# Patient Record
Sex: FEMALE | Race: WHITE | NOT HISPANIC OR LATINO | Employment: UNEMPLOYED | URBAN - METROPOLITAN AREA
[De-identification: names, ages, dates, MRNs, and addresses within clinical notes are randomized per-mention and may not be internally consistent; named-entity substitution may affect disease eponyms.]

---

## 2020-07-15 LAB — POC BETA-HCG (QUAL): POSITIVE

## 2021-06-23 LAB — POC BETA-HCG (QUAL): NEGATIVE

## 2021-07-28 LAB — POC BETA-HCG (QUAL): NEGATIVE

## 2022-04-11 ENCOUNTER — HISTORICAL (OUTPATIENT)
Dept: ADMINISTRATIVE | Facility: HOSPITAL | Age: 22
End: 2022-04-11

## 2022-04-29 VITALS
DIASTOLIC BLOOD PRESSURE: 58 MMHG | WEIGHT: 107 LBS | HEIGHT: 63 IN | SYSTOLIC BLOOD PRESSURE: 82 MMHG | BODY MASS INDEX: 18.96 KG/M2

## 2022-09-21 ENCOUNTER — HISTORICAL (OUTPATIENT)
Dept: ADMINISTRATIVE | Facility: HOSPITAL | Age: 22
End: 2022-09-21

## 2023-01-24 ENCOUNTER — DOCUMENTATION ONLY (OUTPATIENT)
Dept: OBSTETRICS AND GYNECOLOGY | Facility: CLINIC | Age: 23
End: 2023-01-24

## 2023-02-07 ENCOUNTER — TELEPHONE (OUTPATIENT)
Dept: OBSTETRICS AND GYNECOLOGY | Facility: CLINIC | Age: 23
End: 2023-02-07

## 2023-02-07 NOTE — TELEPHONE ENCOUNTER
Patient aware of results; will begin taking probiotic Florajen.    ----- Message from Roberto Kim MD sent at 2/7/2023  8:01 AM CST -----  ONE SWAB WAS NEGATIVE BUT DID NOT SHOW ANY LACTOBACILLI.  PT NEEDS FLORAJEN FOR WOMEN, OTC.

## 2023-06-14 ENCOUNTER — OFFICE VISIT (OUTPATIENT)
Dept: OBSTETRICS AND GYNECOLOGY | Facility: CLINIC | Age: 23
End: 2023-06-14
Payer: MEDICAID

## 2023-06-14 VITALS
WEIGHT: 100.44 LBS | BODY MASS INDEX: 18.48 KG/M2 | OXYGEN SATURATION: 99 % | RESPIRATION RATE: 17 BRPM | HEIGHT: 62 IN | HEART RATE: 87 BPM | SYSTOLIC BLOOD PRESSURE: 110 MMHG | DIASTOLIC BLOOD PRESSURE: 62 MMHG

## 2023-06-14 DIAGNOSIS — R30.0 DYSURIA: ICD-10-CM

## 2023-06-14 DIAGNOSIS — Z01.419 WOMEN'S ANNUAL ROUTINE GYNECOLOGICAL EXAMINATION: ICD-10-CM

## 2023-06-14 DIAGNOSIS — N89.8 VAGINAL DISCHARGE: Primary | ICD-10-CM

## 2023-06-14 LAB
BILIRUB UR QL STRIP: NEGATIVE
GLUCOSE UR QL STRIP: NEGATIVE
KETONES UR QL STRIP: NEGATIVE
LEUKOCYTE ESTERASE UR QL STRIP: NEGATIVE
PH, POC UA: 7
POC BLOOD, URINE: NEGATIVE
POC NITRATES, URINE: NEGATIVE
PROT UR QL STRIP: NEGATIVE
SP GR UR STRIP: 1.01 (ref 1–1.03)
UROBILINOGEN UR STRIP-ACNC: 0.2 (ref 0.1–1.1)

## 2023-06-14 PROCEDURE — 3074F SYST BP LT 130 MM HG: CPT | Mod: CPTII,,, | Performed by: NURSE PRACTITIONER

## 2023-06-14 PROCEDURE — 3074F PR MOST RECENT SYSTOLIC BLOOD PRESSURE < 130 MM HG: ICD-10-PCS | Mod: CPTII,,, | Performed by: NURSE PRACTITIONER

## 2023-06-14 PROCEDURE — 81003 URINALYSIS AUTO W/O SCOPE: CPT | Mod: QW,,, | Performed by: NURSE PRACTITIONER

## 2023-06-14 PROCEDURE — 99395 PREV VISIT EST AGE 18-39: CPT | Mod: 25,,, | Performed by: NURSE PRACTITIONER

## 2023-06-14 PROCEDURE — 1159F MED LIST DOCD IN RCRD: CPT | Mod: CPTII,,, | Performed by: NURSE PRACTITIONER

## 2023-06-14 PROCEDURE — 81003 POCT URINALYSIS, DIPSTICK, AUTOMATED, W/O SCOPE: ICD-10-PCS | Mod: QW,,, | Performed by: NURSE PRACTITIONER

## 2023-06-14 PROCEDURE — 3078F DIAST BP <80 MM HG: CPT | Mod: CPTII,,, | Performed by: NURSE PRACTITIONER

## 2023-06-14 PROCEDURE — 3008F BODY MASS INDEX DOCD: CPT | Mod: CPTII,,, | Performed by: NURSE PRACTITIONER

## 2023-06-14 PROCEDURE — 99395 PR PREVENTIVE VISIT,EST,18-39: ICD-10-PCS | Mod: 25,,, | Performed by: NURSE PRACTITIONER

## 2023-06-14 PROCEDURE — 3008F PR BODY MASS INDEX (BMI) DOCUMENTED: ICD-10-PCS | Mod: CPTII,,, | Performed by: NURSE PRACTITIONER

## 2023-06-14 PROCEDURE — 3078F PR MOST RECENT DIASTOLIC BLOOD PRESSURE < 80 MM HG: ICD-10-PCS | Mod: CPTII,,, | Performed by: NURSE PRACTITIONER

## 2023-06-14 PROCEDURE — 1159F PR MEDICATION LIST DOCUMENTED IN MEDICAL RECORD: ICD-10-PCS | Mod: CPTII,,, | Performed by: NURSE PRACTITIONER

## 2023-06-14 PROCEDURE — 1160F PR REVIEW ALL MEDS BY PRESCRIBER/CLIN PHARMACIST DOCUMENTED: ICD-10-PCS | Mod: CPTII,,, | Performed by: NURSE PRACTITIONER

## 2023-06-14 PROCEDURE — 1160F RVW MEDS BY RX/DR IN RCRD: CPT | Mod: CPTII,,, | Performed by: NURSE PRACTITIONER

## 2023-06-14 NOTE — PROGRESS NOTES
"Patient ID: [id number]   Chief Complaint: Annual exam  Chief Complaint   Patient presents with    Vaginal Discharge     C/o white/yellow discharge that patient states "its runny" no foul odor. C/o burning upon urination.      HPI:   Carol West is a 23 y.o. year old  here for her Annual Exam. Patient's last menstrual period was 2023 (exact date). She is doing well. Denies any health changes. Vaginal Discharge (C/o white/yellow discharge that patient states "its runny" no foul odor. C/o burning upon urination. )    Subjective:   History reviewed. No pertinent past medical history.  History reviewed. No pertinent surgical history.  Social History     Tobacco Use    Smoking status: Never     Passive exposure: Never    Smokeless tobacco: Never   Substance Use Topics    Alcohol use: Yes     Comment: social    Drug use: Never     History reviewed. No pertinent family history.  OB History    Para Term  AB Living   2 2 2     2   SAB IAB Ectopic Multiple Live Births           2      # Outcome Date GA Lbr Alexis/2nd Weight Sex Delivery Anes PTL Lv   2 Term 21 39w0d  3.345 kg (7 lb 6 oz) M Vag-Spont EPI  MICHAEL   1 Term 19 39w0d  2.92 kg (6 lb 7 oz) M Vag-Spont EPI  MICHAEL     Review of Systems 12 point review of systems conducted, negative except as stated in the history of present illness. See HPI for details.  Objective:   Visit Vitals  /62 (BP Location: Left arm)   Pulse 87   Resp 17   Ht 5' 2" (1.575 m)   Wt 45.5 kg (100 lb 6.7 oz)   LMP 2023 (Exact Date)   SpO2 99%   BMI 18.37 kg/m²     Physical Exam:  Physical Exam  Constitutional:  General Appearance : alert, in no acute distress, normal, well nourished.  Respiratory:  Respiratory Effort: normal.  Breast:  Right: Inspection/palpation: no discharge, no masses present, no nipple retraction, no skin changes, no skin dimpling, no tenderness, no lymphadenopathy, no axillary mass, no axillary tenderness.  Left: Inspection/palpation: " no discharge, no masses present, no nipple retraction, no skin changes, no skin dimpling, no tenderness, no lymphadenopathy, no axillary mass, no axillary tenderness.  Gastrointestinal:  Abdomen: no masses. no tender, nondistended.  Liver and spleen: normal  Hernias: no hernias present, no inguinal adenopathy.  Genitourinary:  External Genitalia: normal, no lesions.  Vagina: normal appearance, no abnormal discharge, no lesions.  Bladder: no mass, nontender.  Urethra: no erythema or lesions present.  Cervix: no lesions, non tender. Pap Done  Uterus: nontender, normal contour, normal mobility, normal size.   Adnexa: no masses, no tenderness.  Anus and Perineum: visually normal.   Chaperone Present  Recent Results (from the past 24 hour(s))   POCT Urinalysis, Dipstick, Automated, W/O Scope    Collection Time: 06/14/23  2:39 PM   Result Value Ref Range    POC Blood, Urine Negative Negative    POC Bilirubin, Urine Negative Negative    POC Urobilinogen, Urine 0.2 0.1 - 1.1    POC Ketones, Urine Negative Negative    POC Protein, Urine Negative Negative    POC Nitrates, Urine Negative Negative    POC Glucose, Urine Negative Negative    pH, UA 7.0     POC Specific Gravity, Urine 1.015 1.003 - 1.029    POC Leukocytes, Urine Negative Negative     Assessment/Plan:   Assessment:   Vaginal discharge  -     MDL Sendout Test    Dysuria  -     POCT Urinalysis, Dipstick, Automated, W/O Scope    Women's annual routine gynecological examination  -     Cancel: Liquid-Based Pap Smear, Screening Screening  -     Liquid-Based Pap Smear, Screening Screening      No follow-ups on file. In addition to their scheduled FU, the patient has also been instructed to follow up on as needed basis. All questions were answered and the patient voiced understanding of the above issues.

## 2023-06-19 ENCOUNTER — DOCUMENTATION ONLY (OUTPATIENT)
Dept: OBSTETRICS AND GYNECOLOGY | Facility: CLINIC | Age: 23
End: 2023-06-19
Payer: MEDICAID

## 2023-06-20 LAB — PSYCHE PATHOLOGY RESULT: NORMAL

## 2023-06-26 ENCOUNTER — TELEPHONE (OUTPATIENT)
Dept: OBSTETRICS AND GYNECOLOGY | Facility: CLINIC | Age: 23
End: 2023-06-26
Payer: MEDICAID

## 2023-06-26 DIAGNOSIS — N76.0 BACTERIAL VAGINOSIS: Primary | ICD-10-CM

## 2023-06-26 DIAGNOSIS — B96.89 BACTERIAL VAGINOSIS: Primary | ICD-10-CM

## 2023-06-26 RX ORDER — METRONIDAZOLE 500 MG/1
500 TABLET ORAL EVERY 12 HOURS
Qty: 14 TABLET | Refills: 0 | Status: SHIPPED | OUTPATIENT
Start: 2023-06-26 | End: 2023-06-26

## 2023-06-26 RX ORDER — METRONIDAZOLE 500 MG/1
500 TABLET ORAL EVERY 12 HOURS
Qty: 14 TABLET | Refills: 0 | Status: SHIPPED | OUTPATIENT
Start: 2023-06-26 | End: 2023-07-03

## 2023-06-26 NOTE — TELEPHONE ENCOUNTER
----- Message from BROOKE Diaz sent at 6/22/2023 11:00 PM CDT -----  ASCUS PAP and HPV positive. Please notify pt. BV positive. Flagyl 500mg bid x 7 days.

## 2024-09-16 ENCOUNTER — OFFICE VISIT (OUTPATIENT)
Dept: OBSTETRICS AND GYNECOLOGY | Facility: CLINIC | Age: 24
End: 2024-09-16
Payer: MEDICAID

## 2024-09-16 VITALS
SYSTOLIC BLOOD PRESSURE: 102 MMHG | WEIGHT: 110 LBS | BODY MASS INDEX: 20.12 KG/M2 | DIASTOLIC BLOOD PRESSURE: 60 MMHG | HEART RATE: 90 BPM

## 2024-09-16 DIAGNOSIS — N91.2 AMENORRHEA: Primary | ICD-10-CM

## 2024-09-16 DIAGNOSIS — Z32.01 POSITIVE PREGNANCY TEST: ICD-10-CM

## 2024-09-16 DIAGNOSIS — O21.9 NAUSEA AND VOMITING IN PREGNANCY: ICD-10-CM

## 2024-09-16 LAB
B-HCG UR QL: POSITIVE
CTP QC/QA: YES

## 2024-09-16 PROCEDURE — 81025 URINE PREGNANCY TEST: CPT | Mod: ,,, | Performed by: NURSE PRACTITIONER

## 2024-09-16 PROCEDURE — 3008F BODY MASS INDEX DOCD: CPT | Mod: CPTII,,, | Performed by: NURSE PRACTITIONER

## 2024-09-16 PROCEDURE — 3074F SYST BP LT 130 MM HG: CPT | Mod: CPTII,,, | Performed by: NURSE PRACTITIONER

## 2024-09-16 PROCEDURE — 1159F MED LIST DOCD IN RCRD: CPT | Mod: CPTII,,, | Performed by: NURSE PRACTITIONER

## 2024-09-16 PROCEDURE — 3078F DIAST BP <80 MM HG: CPT | Mod: CPTII,,, | Performed by: NURSE PRACTITIONER

## 2024-09-16 PROCEDURE — 99213 OFFICE O/P EST LOW 20 MIN: CPT | Mod: TH,,, | Performed by: NURSE PRACTITIONER

## 2024-09-16 PROCEDURE — 1160F RVW MEDS BY RX/DR IN RCRD: CPT | Mod: CPTII,,, | Performed by: NURSE PRACTITIONER

## 2024-09-16 RX ORDER — ONDANSETRON 4 MG/1
4 TABLET, ORALLY DISINTEGRATING ORAL EVERY 8 HOURS PRN
Qty: 30 TABLET | Refills: 1 | Status: SHIPPED | OUTPATIENT
Start: 2024-09-16

## 2024-09-16 NOTE — PROGRESS NOTES
Patient ID: [id number]   Chief Complaint: CONFIRMED PREGNANCY  (LMP 2024 LINDSAY 2025 6 WEEK 3 DAYS)    HPI:   Carol West is a 24 y.o.  here today for CONFIRMED PREGNANCY  (LMP 2024 LINDSAY 2025 6 WEEK 3 DAYS)   Patient's last menstrual period was 2024.  Past Medical History:  has no past medical history on file.  Surgical History:  has no past surgical history on file.  Family History: family history is not on file.  Social History:  reports that she has never smoked. She has never been exposed to tobacco smoke. She has never used smokeless tobacco. She reports that she does not currently use alcohol. She reports that she does not use drugs.  Current Outpatient Medications   Medication Sig Dispense Refill    prenatal 21-iron fu-folic acid 14 mg iron- 400 mcg Tab Take by mouth.      ondansetron (ZOFRAN-ODT) 4 MG TbDL Take 1 tablet (4 mg total) by mouth every 8 (eight) hours as needed (NAUSEA). 30 tablet 1     No current facility-administered medications for this visit.     Patient has No Known Allergies.  MENARCHEAL:  PREGNANT  PAP:  Last PAP: 2023    History of Abnormal PAP Smear: YES:   Treated: UNSURE  HPV Vaccine: YES:   INTERCOURSE:  Dyspareunia: No  Postcoital Bleeding: No  History of STI: No   If yes, then: No   Current Birth Control Method: none  Sexually Active: yes  Recent Results (from the past 24 hour(s))   POCT Urine Pregnancy    Collection Time: 24  9:14 AM   Result Value Ref Range    POC Preg Test, Ur Positive (A) Negative     Acceptable Yes        Subjective:   Review of Systems  12 point review of systems conducted, negative except as stated in the history of present illness. See HPI for details.  Objective:     Visit Vitals  /60   Pulse 90   Wt 49.9 kg (110 lb)   LMP 2024   BMI 20.12 kg/m²     Recent Results (from the past 24 hour(s))   POCT Urine Pregnancy    Collection Time: 24  9:14 AM   Result Value Ref Range    POC  Preg Test, Ur Positive (A) Negative     Acceptable Yes      Physical Exam  Constitutional:  General Appearance : alert, in no acute distress, normal, well nourished.  Neck/Thyroid:  Inspection/Palpation: normal.  Respiratory:  Respiratory Effort: normal.  Breast:  NO BREAST EXAM TODAY  Gastrointestinal:  Abdomen: no masses. no tender, nondistended.  Liver and spleen: normal  Hernias: no hernias present, no inguinal adenopathy.  Genitourinary:  NO PELVIC EXAM TODAY  Chaperone Present  Assessment:       ICD-10-CM ICD-9-CM   1. Amenorrhea  N91.2 626.0   2. Positive pregnancy test  Z32.01 V72.42   3. Nausea and vomiting in pregnancy  O21.9 643.90     Plan   Amenorrhea  -     POCT Urine Pregnancy    Positive pregnancy test    Nausea and vomiting in pregnancy    Other orders  -     ondansetron (ZOFRAN-ODT) 4 MG TbDL; Take 1 tablet (4 mg total) by mouth every 8 (eight) hours as needed (NAUSEA).  Dispense: 30 tablet; Refill: 1    Follow up in about 2 weeks (around 9/30/2024) for NOW. In addition to their scheduled follow up, the patient has also been instructed to follow up on as needed basis.     BROOKE Diaz

## 2024-10-02 ENCOUNTER — INITIAL PRENATAL (OUTPATIENT)
Dept: OBSTETRICS AND GYNECOLOGY | Facility: CLINIC | Age: 24
End: 2024-10-02
Payer: MEDICAID

## 2024-10-02 VITALS
SYSTOLIC BLOOD PRESSURE: 108 MMHG | BODY MASS INDEX: 20.49 KG/M2 | HEART RATE: 98 BPM | DIASTOLIC BLOOD PRESSURE: 60 MMHG | WEIGHT: 112 LBS

## 2024-10-02 DIAGNOSIS — Z11.3 SCREEN FOR SEXUALLY TRANSMITTED DISEASES: ICD-10-CM

## 2024-10-02 DIAGNOSIS — Z36.87 UNSURE OF LMP (LAST MENSTRUAL PERIOD) AS REASON FOR ULTRASOUND SCAN: ICD-10-CM

## 2024-10-02 DIAGNOSIS — Z34.81 ENCOUNTER FOR SUPERVISION OF OTHER NORMAL PREGNANCY IN FIRST TRIMESTER: Primary | ICD-10-CM

## 2024-10-02 DIAGNOSIS — Z12.4 SCREENING FOR MALIGNANT NEOPLASM OF THE CERVIX: ICD-10-CM

## 2024-10-02 DIAGNOSIS — Z3A.08 8 WEEKS GESTATION OF PREGNANCY: ICD-10-CM

## 2024-10-02 DIAGNOSIS — N63.0 BREAST NODULE: ICD-10-CM

## 2024-10-02 LAB
AMPHET UR QL SCN: NEGATIVE
BARBITURATE SCN PRESENT UR: NEGATIVE
BENZODIAZ UR QL SCN: NEGATIVE
BILIRUB UR QL STRIP: NEGATIVE
CANNABINOIDS UR QL SCN: NEGATIVE
COCAINE UR QL SCN: NEGATIVE
GLUCOSE UR QL STRIP: NEGATIVE
KETONES UR QL STRIP: NEGATIVE
LEUKOCYTE ESTERASE UR QL STRIP: NEGATIVE
METHADONE UR QL SCN: NEGATIVE
OPIATES UR QL SCN: NEGATIVE
PCP UR QL: NEGATIVE
PH UR: 6 [PH] (ref 5–8)
PH, POC UA: 6
POC BLOOD, URINE: NEGATIVE
POC NITRATES, URINE: NEGATIVE
PROT UR QL STRIP: NEGATIVE
SP GR UR STRIP: 1 (ref 1–1.03)
UROBILINOGEN UR STRIP-ACNC: 0.2 (ref 0.1–1.1)

## 2024-10-02 PROCEDURE — 87086 URINE CULTURE/COLONY COUNT: CPT | Performed by: OBSTETRICS & GYNECOLOGY

## 2024-10-02 PROCEDURE — 80307 DRUG TEST PRSMV CHEM ANLYZR: CPT | Performed by: OBSTETRICS & GYNECOLOGY

## 2024-10-02 PROCEDURE — 87661 TRICHOMONAS VAGINALIS AMPLIF: CPT | Performed by: OBSTETRICS & GYNECOLOGY

## 2024-10-02 PROCEDURE — 87591 N.GONORRHOEAE DNA AMP PROB: CPT | Performed by: OBSTETRICS & GYNECOLOGY

## 2024-10-02 PROCEDURE — 87491 CHLMYD TRACH DNA AMP PROBE: CPT | Performed by: OBSTETRICS & GYNECOLOGY

## 2024-10-02 NOTE — PROGRESS NOTES
Chief Complaint: New OB     HPI:      Elizabeth Alegre is a 24 y.o.  who presents to clinic for new ob. Initial Prenatal Visit.   PT DENIES VAGINAL BLEEDING AND CRAMPING but C/O LUMP ON LEFT BREAST, PRESENT FOR SEVERAL WEEKS, NO DISCHARGE.    Patient's last menstrual period was 2024.   Last pap 2023      History reviewed. No pertinent past medical history.  History reviewed. No pertinent surgical history.  Social History     Tobacco Use    Smoking status: Never     Passive exposure: Never    Smokeless tobacco: Never   Substance Use Topics    Alcohol use: Not Currently     Comment: social    Drug use: Never     No family history on file.  OB History    Para Term  AB Living   3 2 2     2   SAB IAB Ectopic Multiple Live Births           2      # Outcome Date GA Lbr Jaison/2nd Weight Sex Type Anes PTL Lv   3 Current            2 Term 21 39w0d  3.345 kg (7 lb 6 oz) M Vag-Spont EPI  DEBBIE   1 Term 19 39w0d  2.92 kg (6 lb 7 oz) M Vag-Spont EPI  DEBBIE       Current Outpatient Medications:     ondansetron (ZOFRAN-ODT) 4 MG TbDL, Take 1 tablet (4 mg total) by mouth every 8 (eight) hours as needed (NAUSEA)., Disp: 30 tablet, Rfl: 1    prenatal 21-iron fu-folic acid 14 mg iron- 400 mcg Tab, Take by mouth., Disp: , Rfl:       ROS:     Review of Systems   General/Constitutional:  Hot flash denies . Chills denies. Fatigue/weakness denies . Fever denies . Night sweats denies .  Respiratory:  Cough denies . Hemoptysis denies . SOB denies . Sputum production denies . Wheezing denies .  Breast:  Changes in skin of nipple or breast denies . Breast lump denies. Breast pain denies. Nipple discharge denies .  Cardiovascular:  Chest pain denies . Dizziness denies . Palpitations denies . Swelling in hands/feet denies .   Gastrointestinal:  Abdominal pain denies . Blood in stool denies . Constipation denies . Diarrhea denies . Heartburn denies . Nausea denies . Vomiting denies .  Women Only:  Vaginal  bleeding denies . Vaginal discharge/ Odor denies . Vaginal lesion/pain denies Breakthrough bleeding denies . Pelvic pain denies . Decreased libido denies. Vulvar lesion/ulcer denies . Prolapse of genital organs denies . Heavy bleeding during menses denies. Irregular menses denies . Painful intercourse denies . Painful menses denies .  Genitourinary:  Incontinence denies . Blood in urine denies. Frequest urination denies . Painful urination denies.  Musculoskeletal:  Joint/calvin pain denies. Decreased ROM denies. Muscle aches denies. Swollen joints denies . Weakness denies.  Neurologic:  Confusion denies. Trouble walking denies. Trouble with balance denies Balance difficulty denies. Gait abnormalities denies. Headache denies. Tingling/Numbness denies.  Psychiatric:  Darshana denies. Homicidal thoughts denies. Mood lability denies. Personality changes denies. Anxiety or panic attacks denies. Auditory/visual hallucinations denies. Delusions denies. Depressed mood denies. Suicidal thoughts denies.    Physical Exam:   /60   Pulse 98   Wt 50.8 kg (112 lb)   LMP 08/02/2024   Breastfeeding Unknown   BMI 20.49 kg/m²   Body mass index is 20.49 kg/m².   PHYSICAL EXAM:  Constitutional:  General Appearance : alert, in no acute distress, normal, well nourished.  Neck/Thyroid:  Inspection/Palpation: normal. Thyroid: normal size and shape.  Respiratory:  Auscultation: clear to auscultation bilaterally. Respiratory Effort: normal.  Breast:  Right: Inspection/palpation: no discharge, no masses present, no nipple retraction, no skin changes, no skin dimpling, no tenderness, no lymphadenopathy, no axillary mass, no axillary tenderness.  Left: Inspection/palpation: no discharge, SMALL SUBCENTIMETER NODULE ABOVE LEFT AREOLA, no nipple retraction, no skin changes, no skin dimpling, no tenderness, no lymphadenopathy, no axillary mass, no axillary tenderness.  Gastrointestinal:  Abdomen: no masses. no tender, nondistended.  Liver and  spleen: normal  Hernias: no hernias present, no inguinal adenopathy.  Genitourinary:  External Genitalia: normal, no lesions.  Vagina: normal appearance, no abnormal discharge, no lesions.  Bladder: no mass, nontender.  Urethra: no erythema or lesions present.  Cervix: no lesions, non tender. PAP/CXS COLLECTED  Uterus: nontender, normal contour, normal mobility, normal size.  Adnexa: no masses, no tenderness.  Anus and Perineum: visually normal.   Chaperone Present    Assessment/Plan:     Elizabeth was seen today for initial prenatal visit.    Diagnoses and all orders for this visit:    Encounter for supervision of other normal pregnancy in first trimester  -     POCT Urinalysis, Dipstick, Automated, W/O Scope  -     Urine Culture High Risk  -     POCT RAPID DRUG SCREEN  -     Drug Screen, Urine    8 weeks gestation of pregnancy  -     Drug Screen, Urine    Screening for malignant neoplasm of the cervix  -     Liquid-Based Pap Smear, Screening  -     Drug Screen, Urine    Screen for sexually transmitted diseases  -     Liquid-Based Pap Smear, Screening  -     Drug Screen, Urine    Unsure of LMP (last menstrual period) as reason for ultrasound scan  -     US OB/GYN Procedure ([facility]) - Extended List - Future  -     Drug Screen, Urine    Breast nodule  -     US Breast Left Complete; Future  -     US Breast Left Complete  -     Drug Screen, Urine        Follow up in about 4 weeks (around 10/30/2024) for OB FOLLOW UP.    Counselin. Encouraged compliance with prenatal vitamins.  2. Discussed PNL and genetic testing.   3. Safety of exercise discussed with patient, and continued active lifestyle encouraged.  4. COVID-19 virus precautions for both patient and her spouse discussed, and available data on COVID vaccination reviewed.  5. Normal course of prenatal care reviewed.  6. Medications safe in pregnancy discussed and list provided.    Use of the [facility] Patient Portal discussed and encouraged during today's  visit.

## 2024-10-05 LAB — BACTERIA UR CULT: NO GROWTH

## 2024-10-14 ENCOUNTER — DOCUMENTATION ONLY (OUTPATIENT)
Dept: OBSTETRICS AND GYNECOLOGY | Facility: CLINIC | Age: 24
End: 2024-10-14
Payer: MEDICAID

## 2024-10-16 PROCEDURE — 86780 TREPONEMA PALLIDUM: CPT | Performed by: OBSTETRICS & GYNECOLOGY

## 2024-10-16 PROCEDURE — 87389 HIV-1 AG W/HIV-1&-2 AB AG IA: CPT | Performed by: OBSTETRICS & GYNECOLOGY

## 2024-10-16 PROCEDURE — 85027 COMPLETE CBC AUTOMATED: CPT | Performed by: OBSTETRICS & GYNECOLOGY

## 2024-10-16 PROCEDURE — 86762 RUBELLA ANTIBODY: CPT | Performed by: OBSTETRICS & GYNECOLOGY

## 2024-10-16 PROCEDURE — 87340 HEPATITIS B SURFACE AG IA: CPT | Performed by: OBSTETRICS & GYNECOLOGY

## 2024-10-16 PROCEDURE — 86803 HEPATITIS C AB TEST: CPT | Performed by: OBSTETRICS & GYNECOLOGY

## 2024-10-30 ENCOUNTER — ROUTINE PRENATAL (OUTPATIENT)
Dept: OBSTETRICS AND GYNECOLOGY | Facility: CLINIC | Age: 24
End: 2024-10-30
Payer: MEDICAID

## 2024-10-30 VITALS
BODY MASS INDEX: 2.14 KG/M2 | WEIGHT: 11.69 LBS | HEART RATE: 87 BPM | DIASTOLIC BLOOD PRESSURE: 60 MMHG | SYSTOLIC BLOOD PRESSURE: 90 MMHG

## 2024-10-30 DIAGNOSIS — Z34.81 ENCOUNTER FOR SUPERVISION OF OTHER NORMAL PREGNANCY IN FIRST TRIMESTER: Primary | ICD-10-CM

## 2024-10-30 DIAGNOSIS — Z3A.12 12 WEEKS GESTATION OF PREGNANCY: ICD-10-CM

## 2024-11-22 ENCOUNTER — PATIENT MESSAGE (OUTPATIENT)
Dept: OTHER | Facility: OTHER | Age: 24
End: 2024-11-22
Payer: MEDICAID

## 2024-11-29 ENCOUNTER — PATIENT MESSAGE (OUTPATIENT)
Dept: OTHER | Facility: OTHER | Age: 24
End: 2024-11-29
Payer: MEDICAID

## 2024-12-05 ENCOUNTER — ROUTINE PRENATAL (OUTPATIENT)
Dept: OBSTETRICS AND GYNECOLOGY | Facility: CLINIC | Age: 24
End: 2024-12-05
Payer: MEDICAID

## 2024-12-05 VITALS
SYSTOLIC BLOOD PRESSURE: 100 MMHG | WEIGHT: 113 LBS | BODY MASS INDEX: 20.67 KG/M2 | HEART RATE: 98 BPM | DIASTOLIC BLOOD PRESSURE: 60 MMHG

## 2024-12-05 DIAGNOSIS — Z34.82 ENCOUNTER FOR SUPERVISION OF OTHER NORMAL PREGNANCY IN SECOND TRIMESTER: Primary | ICD-10-CM

## 2024-12-05 DIAGNOSIS — Z3A.17 17 WEEKS GESTATION OF PREGNANCY: ICD-10-CM

## 2024-12-05 LAB
BILIRUB UR QL STRIP: NEGATIVE
GLUCOSE UR QL STRIP: NEGATIVE
KETONES UR QL STRIP: NEGATIVE
LEUKOCYTE ESTERASE UR QL STRIP: NEGATIVE
PH, POC UA: 7
POC BLOOD, URINE: NEGATIVE
POC NITRATES, URINE: NEGATIVE
PROT UR QL STRIP: NEGATIVE
SP GR UR STRIP: 1.02 (ref 1–1.03)
UROBILINOGEN UR STRIP-ACNC: 0.2 (ref 0.1–1.1)

## 2024-12-05 PROCEDURE — 82105 ALPHA-FETOPROTEIN SERUM: CPT | Performed by: OBSTETRICS & GYNECOLOGY

## 2024-12-05 PROCEDURE — 99213 OFFICE O/P EST LOW 20 MIN: CPT | Mod: TH,,, | Performed by: OBSTETRICS & GYNECOLOGY

## 2024-12-05 NOTE — PROGRESS NOTES
HPI  Elizabeth Alegre is a 24 y.o.  17w6d here for Routine Prenatal Visit (NO C/O'S.)  Denies VB and pelvic pain.    Elizabeth's allergies were reviewed and updated as appropriate.    History reviewed. No pertinent past medical history.  No family history on file.  Social History     Tobacco Use    Smoking status: Never     Passive exposure: Never    Smokeless tobacco: Never   Substance Use Topics    Alcohol use: Not Currently     Comment: social    Drug use: Never     OB History    Para Term  AB Living   3 2 2 0 0 2   SAB IAB Ectopic Multiple Live Births   0 0 0   2      # Outcome Date GA Lbr Jaison/2nd Weight Sex Type Anes PTL Lv   3 Current            2 Term 21 39w0d  3.345 kg (7 lb 6 oz) M Vag-Spont EPI  DEBBIE   1 Term 19 39w0d  2.92 kg (6 lb 7 oz) M Vag-Spont EPI  DEBBIE       Current Outpatient Medications:     ondansetron (ZOFRAN-ODT) 4 MG TbDL, Take 1 tablet (4 mg total) by mouth every 8 (eight) hours as needed (NAUSEA)., Disp: 30 tablet, Rfl: 1    prenatal 21-iron fu-folic acid 14 mg iron- 400 mcg Tab, Take by mouth., Disp: , Rfl:     ROS:  A comprehensive review of symptoms was completed and negative except as noted above.    Physical Exam:  Chaperone present for exam.    /60   Pulse 98   Wt 51.3 kg (113 lb)   LMP 2024   BMI 20.67 kg/m²     Gen: No distress.  Abdomen: Gravid, non-tender.  Pelvic: see below  Extremities: No edema.    Fetal Heart Rate: 154    No results found for this or any previous visit (from the past 24 hours).    ASSESSMENT/PLAN:  1. Encounter for supervision of other normal pregnancy in second trimester  -     POCT Urinalysis, Dipstick, Automated, W/O Scope    2. 17 weeks gestation of pregnancy  -     Cancel: DENNISON GENERIC ORDERABLE [email or web address]  -     Alpha-Fetoprotein (AFP), Single Marker Screen, Maternal, Serum        Miscarriage precautions discussed with patient, as well as labor unit precautions.     Follow Up:  Follow up in about 4 weeks (around  1/2/2025) for ANATOMY US.

## 2024-12-10 LAB
# FETUSES: 1
2ND TRIMESTER 4 SCREEN SERPL-IMP: NORMAL
AFP ADJ MOM SERPL: 1.48 MOM
AFP SERPL IA-MCNC: 72.3 NG/ML
AGE AT DELIVERY: NORMAL
CHORION TYPE: NORMAL
COLLECT DATE: NORMAL
CURRENT SMOKER: NORMAL
GA EST FROM LMP: NORMAL WK,D
GA METHOD: NORMAL
HX OF NTD QL: NO
HX OF NTD QL: NO
IDDM PATIENT QL: NO
IVF PREGNANCY: NO
LABORATORY COMMENT REPORT: NORMAL
M PHYSICIAN PHONE NUMBER: NORMAL
MATERNAL RISK FACTORS: NORMAL
NEURAL TUBE DEFECT RISK FETUS: NORMAL %
RECOM F/U: NORMAL
TEST PERFORMANCE INFO SPEC: NORMAL

## 2024-12-20 ENCOUNTER — PATIENT MESSAGE (OUTPATIENT)
Dept: OTHER | Facility: OTHER | Age: 24
End: 2024-12-20
Payer: MEDICAID

## 2025-01-02 ENCOUNTER — ROUTINE PRENATAL (OUTPATIENT)
Dept: OBSTETRICS AND GYNECOLOGY | Facility: CLINIC | Age: 25
End: 2025-01-02
Payer: MEDICAID

## 2025-01-02 VITALS
HEART RATE: 68 BPM | SYSTOLIC BLOOD PRESSURE: 108 MMHG | WEIGHT: 119.13 LBS | DIASTOLIC BLOOD PRESSURE: 62 MMHG | BODY MASS INDEX: 21.78 KG/M2

## 2025-01-02 DIAGNOSIS — Z34.82 ENCOUNTER FOR SUPERVISION OF OTHER NORMAL PREGNANCY IN SECOND TRIMESTER: Primary | ICD-10-CM

## 2025-01-02 DIAGNOSIS — Z36.3 ENCOUNTER FOR ANTENATAL SCREENING FOR MALFORMATIONS: ICD-10-CM

## 2025-01-02 DIAGNOSIS — Z3A.21 21 WEEKS GESTATION OF PREGNANCY: ICD-10-CM

## 2025-01-02 LAB
BILIRUB UR QL STRIP: NEGATIVE
GLUCOSE UR QL STRIP: NEGATIVE
KETONES UR QL STRIP: NEGATIVE
LEUKOCYTE ESTERASE UR QL STRIP: NEGATIVE
PH, POC UA: 7.5
POC BLOOD, URINE: NEGATIVE
POC NITRATES, URINE: NEGATIVE
PROT UR QL STRIP: NEGATIVE
SP GR UR STRIP: 1.02 (ref 1–1.03)
UROBILINOGEN UR STRIP-ACNC: 1 (ref 0.1–1.1)

## 2025-01-02 NOTE — PROGRESS NOTES
HPI  Elizabeth Alegre is a 24 y.o.  21w6d here for Routine Prenatal Visit Presents to clinic for routine prenatal visit and anatomy u/s..  Denies VB and pelvic pain.    Elizabeth's allergies were reviewed and updated as appropriate.    History reviewed. No pertinent past medical history.  No family history on file.  Social History     Tobacco Use    Smoking status: Never     Passive exposure: Never    Smokeless tobacco: Never   Substance Use Topics    Alcohol use: Not Currently     Comment: social    Drug use: Never     OB History    Para Term  AB Living   3 2 2 0 0 2   SAB IAB Ectopic Multiple Live Births   0 0 0   2      # Outcome Date GA Lbr Jaison/2nd Weight Sex Type Anes PTL Lv   3 Current            2 Term 21 39w0d  3.345 kg (7 lb 6 oz) M Vag-Spont EPI  DEBBIE   1 Term 19 39w0d  2.92 kg (6 lb 7 oz) M Vag-Spont EPI  DEBBIE       Current Outpatient Medications:     ondansetron (ZOFRAN-ODT) 4 MG TbDL, Take 1 tablet (4 mg total) by mouth every 8 (eight) hours as needed (NAUSEA)., Disp: 30 tablet, Rfl: 1    prenatal 21-iron fu-folic acid 14 mg iron- 400 mcg Tab, Take by mouth., Disp: , Rfl:     ROS:  A comprehensive review of symptoms was completed and negative except as noted above.    Physical Exam:  Chaperone present for exam.    /62   Pulse 68   Wt 54 kg (119 lb 1.6 oz)   LMP 2024   BMI 21.78 kg/m²     Gen: No distress.  Abdomen: Gravid, non-tender.  Pelvic: DEFERRED  Extremities: No edema.    Fetal Heart Rate: 150  Movement: Present  Presentation: Vertex    Recent Results (from the past 24 hours)   POCT Urinalysis, Dipstick, Automated, W/O Scope    Collection Time: 25 11:24 AM   Result Value Ref Range    POC Blood, Urine Negative Negative    POC Bilirubin, Urine Negative Negative    POC Urobilinogen, Urine 1.0 0.1 - 1.1    POC Ketones, Urine Negative Negative    POC Protein, Urine Negative Negative    POC Nitrates, Urine Negative Negative    POC Glucose, Urine Negative  Negative    pH, UA 7.5     POC Specific Gravity, Urine 1.020 1.003 - 1.029    POC Leukocytes, Urine Negative Negative     PRENATAL LABS:  ABO/Rh:   Lab Results   Component Value Date/Time    GROUPTRH O POS 10/16/2024 08:15 AM     H&H:  Lab Results   Component Value Date/Time    HGB 13.7 10/16/2024 08:15 AM    HCT 39.9 10/16/2024 08:15 AM     Platelet:  Lab Results   Component Value Date/Time     10/16/2024 08:15 AM     HIV:   Lab Results   Component Value Date/Time    HIV Nonreactive 10/16/2024 08:15 AM     RPR:  Lab Results   Component Value Date/Time    SYPHAB Nonreactive 10/16/2024 08:15 AM     Hepatitis B Surface Antigen:  Lab Results   Component Value Date/Time    HEPBSAG Negative 10/16/2024 08:15 AM     Hepatitis C:  Lab Results   Component Value Date/Time    HEPCAB Nonreactive 10/16/2024 08:15 AM     Rubella Immune Status:  Lab Results   Component Value Date/Time    RUBELLAIGG 61.20 10/16/2024 08:15 AM       Last PAP Date: 10/7/2024    ASSESSMENT/PLAN:  1. Encounter for supervision of other normal pregnancy in second trimester  -     POCT Urinalysis, Dipstick, Automated, W/O Scope    2. 21 weeks gestation of pregnancy    3. Encounter for  screening for malformations  -     US OB/GYN Procedure ([facility]) - Extended List - Today      Miscarriage precautions discussed with patient, as well as labor unit precautions.     Follow Up:  Follow up in about 3 weeks (around 2025) for OB VS.

## 2025-01-17 ENCOUNTER — PATIENT MESSAGE (OUTPATIENT)
Dept: OTHER | Facility: OTHER | Age: 25
End: 2025-01-17
Payer: MEDICAID

## 2025-01-24 ENCOUNTER — TELEPHONE (OUTPATIENT)
Dept: OBSTETRICS AND GYNECOLOGY | Facility: CLINIC | Age: 25
End: 2025-01-24
Payer: MEDICAID

## 2025-01-24 NOTE — TELEPHONE ENCOUNTER
"Patient called  confirmed . States" since yesterday had been having periodic gushes of fluid leakage not sure if its urine or amniotic fluid" kushal Campbell np notified patient advised to go to L&D for eval asap . Patient verbalized understanding   " Home

## 2025-01-29 ENCOUNTER — ROUTINE PRENATAL (OUTPATIENT)
Dept: OBSTETRICS AND GYNECOLOGY | Facility: CLINIC | Age: 25
End: 2025-01-29
Payer: MEDICAID

## 2025-01-29 VITALS
BODY MASS INDEX: 22.61 KG/M2 | HEART RATE: 88 BPM | WEIGHT: 123.63 LBS | DIASTOLIC BLOOD PRESSURE: 64 MMHG | SYSTOLIC BLOOD PRESSURE: 104 MMHG

## 2025-01-29 DIAGNOSIS — Z3A.25 25 WEEKS GESTATION OF PREGNANCY: ICD-10-CM

## 2025-01-29 DIAGNOSIS — Z34.82 ENCOUNTER FOR SUPERVISION OF OTHER NORMAL PREGNANCY IN SECOND TRIMESTER: Primary | ICD-10-CM

## 2025-01-29 LAB
BILIRUB UR QL STRIP: NEGATIVE
GLUCOSE UR QL STRIP: NEGATIVE
KETONES UR QL STRIP: NEGATIVE
LEUKOCYTE ESTERASE UR QL STRIP: NEGATIVE
PH, POC UA: 7.5
POC BLOOD, URINE: NEGATIVE
POC NITRATES, URINE: NEGATIVE
PROT UR QL STRIP: NEGATIVE
SP GR UR STRIP: 1.02 (ref 1–1.03)
UROBILINOGEN UR STRIP-ACNC: 2 (ref 0.1–1.1)

## 2025-01-29 PROCEDURE — 99213 OFFICE O/P EST LOW 20 MIN: CPT | Mod: TH,,, | Performed by: NURSE PRACTITIONER

## 2025-01-29 NOTE — PROGRESS NOTES
HPI  Elizabeth Alegre is a 25 y.o.   25w5d here for Routine Prenatal Visit (NO C/O'S. )  Denies any VB, ROM, and PIH sxs.  Elizabeth's allergies were reviewed and updated as appropriate.  History reviewed. No pertinent past medical history.  No family history on file.  Social History     Tobacco Use    Smoking status: Never     Passive exposure: Never    Smokeless tobacco: Never   Substance Use Topics    Alcohol use: Not Currently     Comment: social    Drug use: Never     OB History    Para Term  AB Living   3 2 2 0 0 2   SAB IAB Ectopic Multiple Live Births   0 0 0   2      # Outcome Date GA Lbr Jaison/2nd Weight Sex Type Anes PTL Lv   3 Current            2 Term 21 39w0d  3.345 kg (7 lb 6 oz) M Vag-Spont EPI  DEBBIE   1 Term 19 39w0d  2.92 kg (6 lb 7 oz) M Vag-Spont EPI  DEBBIE     Current Outpatient Medications:     ondansetron (ZOFRAN-ODT) 4 MG TbDL, Take 1 tablet (4 mg total) by mouth every 8 (eight) hours as needed (NAUSEA)., Disp: 30 tablet, Rfl: 1    prenatal 21-iron fu-folic acid 14 mg iron- 400 mcg Tab, Take by mouth., Disp: , Rfl:     ROS:  A comprehensive review of symptoms was completed and negative except as noted above.    Physical Exam:  Chaperone present for exam.  /64   Pulse 88   Wt 56.1 kg (123 lb 9.6 oz)   LMP 2024   BMI 22.61 kg/m²   Gen: No distress  Abdomen: Gravid, non-tender  Pelvic:   Extremities: No edema  Fundal Height (cm): 25 cm  Fetal Heart Rate: 145  Movement: Present    Recent Results (from the past 24 hours)   POCT Urinalysis, Dipstick, Automated, W/O Scope    Collection Time: 25 11:05 AM   Result Value Ref Range    POC Blood, Urine Negative Negative    POC Bilirubin, Urine Negative Negative    POC Urobilinogen, Urine 2.0 (A) 0.1 - 1.1    POC Ketones, Urine Negative Negative    POC Protein, Urine Negative Negative    POC Nitrates, Urine Negative Negative    POC Glucose, Urine Negative Negative    pH, UA 7.5     POC Specific Gravity, Urine  "1.020 1.003 - 1.029    POC Leukocytes, Urine Negative Negative     ABO/Rh:   Lab Results   Component Value Date/Time    GROUPTRH O POS 10/16/2024 08:15 AM     H&H:  Lab Results   Component Value Date/Time    HGB 13.7 10/16/2024 08:15 AM    HCT 39.9 10/16/2024 08:15 AM     Platelet:  Lab Results   Component Value Date/Time     10/16/2024 08:15 AM     Osulivan: No results found for: "GFJR1AA", "AWAS3FA", "PDTI5QI"  HIV:   Lab Results   Component Value Date/Time    HIV Nonreactive 10/16/2024 08:15 AM     RPR:  Lab Results   Component Value Date/Time    SYPHAB Nonreactive 10/16/2024 08:15 AM     Hepatitis B Surface Antigen:  Lab Results   Component Value Date/Time    HEPBSAG Negative 10/16/2024 08:15 AM   Hepatitis C:  Lab Results   Component Value Date/Time    HEPCAB Nonreactive 10/16/2024 08:15 AM   Rubella Immune Status:  Lab Results   Component Value Date/Time    RUBELLAIGG 61.20 10/16/2024 08:15 AM   GBS:No results found for: "SREPBPCR", "STREPBCULT", "STREPONLY"   Last PAP Date: 10/7/2024  ASSESSMENT/PLAN:  1. Encounter for supervision of other normal pregnancy in second trimester  -     POCT Urinalysis, Dipstick, Automated, W/O Scope    2. 25 weeks gestation of pregnancy    Labor unit and pre-eclampsia precautions given.  Fetal kick count instructions given to patient.   Follow Up:  Follow up in about 4 weeks (around 2/26/2025) for OB VS.   "

## 2025-01-31 ENCOUNTER — PATIENT MESSAGE (OUTPATIENT)
Dept: OTHER | Facility: OTHER | Age: 25
End: 2025-01-31
Payer: MEDICAID

## 2025-02-14 ENCOUNTER — PATIENT MESSAGE (OUTPATIENT)
Dept: OTHER | Facility: OTHER | Age: 25
End: 2025-02-14
Payer: MEDICAID

## 2025-02-19 ENCOUNTER — ROUTINE PRENATAL (OUTPATIENT)
Dept: OBSTETRICS AND GYNECOLOGY | Facility: CLINIC | Age: 25
End: 2025-02-19
Payer: MEDICAID

## 2025-02-19 VITALS
HEART RATE: 92 BPM | BODY MASS INDEX: 23.19 KG/M2 | DIASTOLIC BLOOD PRESSURE: 66 MMHG | WEIGHT: 126.81 LBS | SYSTOLIC BLOOD PRESSURE: 106 MMHG

## 2025-02-19 DIAGNOSIS — Z3A.28 28 WEEKS GESTATION OF PREGNANCY: ICD-10-CM

## 2025-02-19 DIAGNOSIS — Z34.83 ENCOUNTER FOR SUPERVISION OF OTHER NORMAL PREGNANCY IN THIRD TRIMESTER: Primary | ICD-10-CM

## 2025-02-19 LAB
BILIRUB UR QL STRIP: NEGATIVE
ERYTHROCYTE [DISTWIDTH] IN BLOOD BY AUTOMATED COUNT: 12.9 % (ref 11–14.5)
GLUCOSE 1H P 100 G GLC PO SERPL-MCNC: 95 MG/DL (ref 100–180)
GLUCOSE UR QL STRIP: NEGATIVE
HCT VFR BLD AUTO: 28.3 % (ref 36–48)
HGB BLD-MCNC: 9.6 G/DL (ref 11.8–16)
KETONES UR QL STRIP: POSITIVE
LEUKOCYTE ESTERASE UR QL STRIP: NEGATIVE
MCH RBC QN AUTO: 28.6 PG (ref 27–34)
MCHC RBC AUTO-ENTMCNC: 33.9 G/DL (ref 31–37)
MCV RBC AUTO: 84.2 FL (ref 79–99)
NRBC BLD AUTO-RTO: 0 %
PH, POC UA: 7
PLATELET # BLD AUTO: 231 X10(3)/MCL (ref 140–371)
PMV BLD AUTO: 10.6 FL (ref 9.4–12.4)
POC BLOOD, URINE: NEGATIVE
POC NITRATES, URINE: NEGATIVE
PROT UR QL STRIP: NEGATIVE
RBC # BLD AUTO: 3.36 X10(6)/MCL (ref 4–5.1)
SP GR UR STRIP: 1.01 (ref 1–1.03)
UROBILINOGEN UR STRIP-ACNC: 1 (ref 0.1–1.1)
WBC # BLD AUTO: 8.07 X10(3)/MCL (ref 4–11.5)

## 2025-02-19 PROCEDURE — 85027 COMPLETE CBC AUTOMATED: CPT | Performed by: NURSE PRACTITIONER

## 2025-02-19 PROCEDURE — 82950 GLUCOSE TEST: CPT | Performed by: NURSE PRACTITIONER

## 2025-02-19 PROCEDURE — 86780 TREPONEMA PALLIDUM: CPT | Performed by: NURSE PRACTITIONER

## 2025-02-19 NOTE — PROGRESS NOTES
HPI  Elizabeth Alegre is a 25 y.o.   28w5d here for Routine Prenatal Visit (PT HAS A SPOT ON THE BACK OF HER LEFT LEG THAT LOOKS LIKE A BRUISE BUT IT WONT GO AWAY. IT'S BEEN THERE FOR A FEW MONTHS.)  Appears to be capillaries-instr pt to fu with pcp. H&H ordered today  Denies any VB, ROM, and PIH sxs.    Elizabeth's allergies were reviewed and updated as appropriate.    History reviewed. No pertinent past medical history.  No family history on file.  Social History[1]  OB History    Para Term  AB Living   3 2 2 0 0 2   SAB IAB Ectopic Multiple Live Births   0 0 0  2      # Outcome Date GA Lbr Jaison/2nd Weight Sex Type Anes PTL Lv   3 Current            2 Term 21 39w0d  3.345 kg (7 lb 6 oz) M Vag-Spont EPI  DEBBIE   1 Term 19 39w0d  2.92 kg (6 lb 7 oz) M Vag-Spont EPI  DEBBIE     Current Medications[2]    ROS:  A comprehensive review of symptoms was completed and negative except as noted above.    Physical Exam:  Chaperone present for exam.    /66   Pulse 92   Wt 57.5 kg (126 lb 12.8 oz)   LMP 2024   BMI 23.19 kg/m²     Gen: No distress  Abdomen: Gravid, non-tender  Pelvic:   Extremities: No edema    Fundal Height (cm): 27 cm  Fetal Heart Rate: 151    Recent Results (from the past 24 hours)   POCT Urinalysis, Dipstick, Automated, W/O Scope    Collection Time: 25 10:10 AM   Result Value Ref Range    POC Blood, Urine Negative Negative    POC Bilirubin, Urine Negative Negative    POC Urobilinogen, Urine 1.0 0.1 - 1.1    POC Ketones, Urine Positive (A) Negative    POC Protein, Urine Negative Negative    POC Nitrates, Urine Negative Negative    POC Glucose, Urine Negative Negative    pH, UA 7.0     POC Specific Gravity, Urine 1.015 1.003 - 1.029    POC Leukocytes, Urine Negative Negative     ABO/Rh:   Lab Results   Component Value Date/Time    GROUPTRH O POS 10/16/2024 08:15 AM     H&H:  Lab Results   Component Value Date/Time    HGB 13.7 10/16/2024 08:15 AM    HCT 39.9 10/16/2024  "08:15 AM     Platelet:  Lab Results   Component Value Date/Time     10/16/2024 08:15 AM     Osulivan: No results found for: "PIGV8ZZ", "FMNV5OI", "VUVT7DU"  HIV:   Lab Results   Component Value Date/Time    HIV Nonreactive 10/16/2024 08:15 AM     RPR:  Lab Results   Component Value Date/Time    SYPHAB Nonreactive 10/16/2024 08:15 AM     Hepatitis B Surface Antigen:  Lab Results   Component Value Date/Time    HEPBSAG Negative 10/16/2024 08:15 AM     Hepatitis C:  Lab Results   Component Value Date/Time    HEPCAB Nonreactive 10/16/2024 08:15 AM     Rubella Immune Status:  Lab Results   Component Value Date/Time    RUBELLAIGG 61.20 10/16/2024 08:15 AM     GBS:No results found for: "SREPBPCR", "STREPBCULT", "STREPONLY"   Last PAP Date: 10/7/2024    ASSESSMENT/PLAN:  1. Encounter for supervision of other normal pregnancy in third trimester  -     POCT Urinalysis, Dipstick, Automated, W/O Scope    2. 28 weeks gestation of pregnancy  -     SYPHILIS ANTIBODY (WITH REFLEX RPR); Future; Expected date: 02/19/2025  -     GTT 1 Hour; Future; Expected date: 02/19/2025  -     CBC Without Differential; Future; Expected date: 02/19/2025    Labor unit and pre-eclampsia precautions given.  Fetal kick count instructions given to patient.   Follow Up:  Follow up in about 4 weeks (around 3/19/2025) for OB US.        [1]   Social History  Tobacco Use    Smoking status: Never     Passive exposure: Never    Smokeless tobacco: Never   Substance Use Topics    Alcohol use: Not Currently     Comment: social    Drug use: Never   [2]   Current Outpatient Medications:     prenatal 21-iron fu-folic acid 14 mg iron- 400 mcg Tab, Take by mouth., Disp: , Rfl:     ondansetron (ZOFRAN-ODT) 4 MG TbDL, Take 1 tablet (4 mg total) by mouth every 8 (eight) hours as needed (NAUSEA). (Patient not taking: Reported on 2/19/2025), Disp: 30 tablet, Rfl: 1    "

## 2025-02-20 LAB — T PALLIDUM AB SER QL: NONREACTIVE

## 2025-02-26 ENCOUNTER — RESULTS FOLLOW-UP (OUTPATIENT)
Dept: OBSTETRICS AND GYNECOLOGY | Facility: CLINIC | Age: 25
End: 2025-02-26
Payer: MEDICAID

## 2025-02-26 DIAGNOSIS — O99.013 ANEMIA DURING PREGNANCY IN THIRD TRIMESTER: Primary | ICD-10-CM

## 2025-02-26 RX ORDER — FERROUS SULFATE 325(65) MG
325 TABLET, DELAYED RELEASE (ENTERIC COATED) ORAL 2 TIMES DAILY
Qty: 60 TABLET | Refills: 3 | Status: SHIPPED | OUTPATIENT
Start: 2025-02-26

## 2025-02-26 RX ORDER — ASCORBIC ACID 500 MG
500 TABLET ORAL 2 TIMES DAILY
Qty: 60 TABLET | Refills: 3 | Status: SHIPPED | OUTPATIENT
Start: 2025-02-26

## 2025-02-26 RX ORDER — DOCUSATE SODIUM 100 MG/1
100 CAPSULE, LIQUID FILLED ORAL 2 TIMES DAILY
Qty: 60 CAPSULE | Refills: 3 | Status: SHIPPED | OUTPATIENT
Start: 2025-02-26

## 2025-02-26 RX ORDER — FOLIC ACID 1 MG/1
1 TABLET ORAL DAILY
Qty: 30 TABLET | Refills: 5 | Status: SHIPPED | OUTPATIENT
Start: 2025-02-26

## 2025-02-26 NOTE — TELEPHONE ENCOUNTER
CALLED PATIENT  CONFIRMED PATIENT INSTRUCTED MAHENDRA GILBETR NP REVIEWED LABS AND SHE IS ANEMIC AND NEEDS TO START ANEMIA PROTOCOL. INSTRUCTED ON MEDS. MEDS SENT TO PHARMACY. PATIENT INSTRUCTED WILL REPEAT CBC IN 4 WEEKS. PATIENT INSTRUCTED SHE PASSED 1HR GTT ----- Message from ALEXANDRA Stoll sent at 2025 12:55 PM CST -----  Results Reviewed.   Please send pt anemia protocol and inform pt. Repeat cbc 4 weeks.   Passed one hour.   ----- Message -----  From: Lab, Background User  Sent: 2025   3:42 PM CST  To: ALEXANDRA Stoll

## 2025-02-28 ENCOUNTER — PATIENT MESSAGE (OUTPATIENT)
Dept: OTHER | Facility: OTHER | Age: 25
End: 2025-02-28
Payer: MEDICAID

## 2025-03-12 ENCOUNTER — TELEPHONE (OUTPATIENT)
Dept: OBSTETRICS AND GYNECOLOGY | Facility: CLINIC | Age: 25
End: 2025-03-12
Payer: MEDICAID

## 2025-03-12 NOTE — TELEPHONE ENCOUNTER
PATIENT CALLED SAYING THAT SHE IS 31 WEEKS PREGNANT AND SHE IS HAVING SOME CRAMPS THAT FEEL LIKE PERIOD CRAMPS ALONG WITH NAUSEA AND AN INCREASE IN DISCHARGE. SPOKE WITH DR TREVINO. ADVISED PT THAT DR. TREVINO SAID THAT SHE NEEDS TO GO TO THE LABOR UNIT TO BE MONITORED BUT THAT [facility] IS ON DIVERSION SO IF POSSIBLE SHE SHOULD GO TO THE [facility]. PT SAID THAT [facility] WOULD BE CLOSER FOR HER SO SHE WILL GO THERE AS SOON AS SHE CAN.

## 2025-03-14 ENCOUNTER — PATIENT MESSAGE (OUTPATIENT)
Dept: OTHER | Facility: OTHER | Age: 25
End: 2025-03-14
Payer: MEDICAID

## 2025-03-19 ENCOUNTER — ROUTINE PRENATAL (OUTPATIENT)
Dept: OBSTETRICS AND GYNECOLOGY | Facility: CLINIC | Age: 25
End: 2025-03-19
Payer: MEDICAID

## 2025-03-19 VITALS — SYSTOLIC BLOOD PRESSURE: 100 MMHG | HEART RATE: 89 BPM | DIASTOLIC BLOOD PRESSURE: 60 MMHG

## 2025-03-19 DIAGNOSIS — Z28.21 REFUSED INFLUENZA VACCINE: ICD-10-CM

## 2025-03-19 DIAGNOSIS — Z71.85 VACCINE COUNSELING: ICD-10-CM

## 2025-03-19 DIAGNOSIS — Z3A.32 32 WEEKS GESTATION OF PREGNANCY: ICD-10-CM

## 2025-03-19 DIAGNOSIS — Z34.83 ENCOUNTER FOR SUPERVISION OF OTHER NORMAL PREGNANCY IN THIRD TRIMESTER: Primary | ICD-10-CM

## 2025-03-19 DIAGNOSIS — Z36.89 ENCOUNTER FOR ULTRASOUND TO ASSESS FETAL GROWTH: ICD-10-CM

## 2025-03-19 DIAGNOSIS — O99.013 ANEMIA DURING PREGNANCY IN THIRD TRIMESTER: ICD-10-CM

## 2025-03-19 DIAGNOSIS — Z28.21 COVID-19 VACCINATION REFUSED: ICD-10-CM

## 2025-03-19 LAB
ERYTHROCYTE [DISTWIDTH] IN BLOOD BY AUTOMATED COUNT: 13.2 % (ref 11–14.5)
HCT VFR BLD AUTO: 30.3 % (ref 36–48)
HGB BLD-MCNC: 10 G/DL (ref 11.8–16)
MCH RBC QN AUTO: 27.4 PG (ref 27–34)
MCHC RBC AUTO-ENTMCNC: 33 G/DL (ref 31–37)
MCV RBC AUTO: 83 FL (ref 79–99)
NRBC BLD AUTO-RTO: 0 %
PLATELET # BLD AUTO: 244 X10(3)/MCL (ref 140–371)
PMV BLD AUTO: 11 FL (ref 9.4–12.4)
RBC # BLD AUTO: 3.65 X10(6)/MCL (ref 4–5.1)
WBC # BLD AUTO: 9.74 X10(3)/MCL (ref 4–11.5)

## 2025-03-19 PROCEDURE — 85027 COMPLETE CBC AUTOMATED: CPT | Performed by: OBSTETRICS & GYNECOLOGY

## 2025-03-19 NOTE — PROGRESS NOTES
HPI  Elizabeth Alegre is a 25 y.o.   32w5d here for Routine Prenatal Visit (NO C/O'S.)  PT. INSTRUCTED ON T-DAP, COVID AND FLU VACCINE RECOMMENDED IN PREGNANCY. PT ADVISED T-DAP CAN BE ADMINISTERED IN CLINIC. COVID AND FLU VACCINE WILL HAVE BE ADMINISTERED AT LOCAL PHARMACY OR PCP, HAND OUT GIVEN.  Denies any VB, ROM, and PIH sxs. Reports active fetal movements.     Elizabeth's allergies were reviewed and updated as appropriate.    History reviewed. No pertinent past medical history.  No family history on file.  Social History[1]  OB History    Para Term  AB Living   3 2 2 0 0 2   SAB IAB Ectopic Multiple Live Births   0 0 0  2      # Outcome Date GA Lbr Jaison/2nd Weight Sex Type Anes PTL Lv   3 Current            2 Term 21 39w0d  3.345 kg (7 lb 6 oz) M Vag-Spont EPI  DEBBIE   1 Term 19 39w0d  2.92 kg (6 lb 7 oz) M Vag-Spont EPI  DEBBIE     Current Medications[2]    ROS:  A comprehensive review of symptoms was completed and negative except as noted above.    Physical Exam:    Chaperone present for exam.    /60   Pulse 89   LMP 2024     Gen: No distress  Abdomen: Gravid, non-tender  Pelvic: DEFERRED  Extremities: No edema    Fetal Heart Rate: 143  Movement: Present  Presentation: Vertex    No results found for this or any previous visit (from the past 24 hours).  ABO/Rh:   Lab Results   Component Value Date/Time    GROUPTRH O POS 10/16/2024 08:15 AM       H&H:  Lab Results   Component Value Date/Time    HGB 9.6 (L) 2025 11:00 AM    HCT 28.3 (L) 2025 11:00 AM       Platelet:  Lab Results   Component Value Date/Time     2025 11:00 AM       Osulivan:   Lab Results   Component Value Date/Time    SEQB4OR 95 (L) 2025 11:00 AM       HIV:   Lab Results   Component Value Date/Time    HIV Nonreactive 10/16/2024 08:15 AM       RPR:  Lab Results   Component Value Date/Time    SYPHAB Nonreactive 2025 11:00 AM       Hepatitis B Surface Antigen:  Lab Results    Component Value Date/Time    HEPBSAG Negative 10/16/2024 08:15 AM       Hepatitis C:  Lab Results   Component Value Date/Time    HEPCAB Nonreactive 10/16/2024 08:15 AM       Rubella Immune Status:  Lab Results   Component Value Date/Time    RUBELLAIGG 61.20 10/16/2024 08:15 AM     Last PAP Date: 10/7/2024    ASSESSMENT/PLAN:    1. Encounter for supervision of other normal pregnancy in third trimester  -     POCT Urinalysis, Dipstick, Automated, W/O Scope    2. 32 weeks gestation of pregnancy    3. Anemia during pregnancy in third trimester  -     CBC Without Differential; Future; Expected date: 03/31/2025    4. Encounter for ultrasound to assess fetal growth  -     US OB/GYN Procedure ([facility]) - Extended List - Future    5. Vaccine counseling  Comments:  CONSIDERING TDAP, WILL ASK THE PATIENT NEXT VS    6. Refused influenza vaccine    7. COVID-19 vaccination refused      Labor unit and pre-eclampsia precautions given.  Fetal kick count instructions given to patient.     Follow Up:  Follow up in about 2 weeks (around 4/2/2025) for OB FOLLOW UP.             [1]   Social History  Tobacco Use    Smoking status: Never     Passive exposure: Never    Smokeless tobacco: Never   Substance Use Topics    Alcohol use: Not Currently     Comment: social    Drug use: Never   [2]   Current Outpatient Medications:     ascorbic acid, vitamin C, (VITAMIN C) 500 MG tablet, Take 1 tablet (500 mg total) by mouth 2 (two) times daily., Disp: 60 tablet, Rfl: 3    docusate sodium (COLACE) 100 MG capsule, Take 1 capsule (100 mg total) by mouth 2 (two) times daily., Disp: 60 capsule, Rfl: 3    ferrous sulfate 325 (65 FE) MG EC tablet, Take 1 tablet (325 mg total) by mouth 2 (two) times daily. (Patient taking differently: Take 325 mg by mouth 2 (two) times daily. PT HAVE NOT  MEDICINE AT PHARMACY), Disp: 60 tablet, Rfl: 3    folic acid (FOLVITE) 1 MG tablet, Take 1 tablet (1 mg total) by mouth once daily., Disp: 30 tablet, Rfl: 5     prenatal 21-iron fu-folic acid 14 mg iron- 400 mcg Tab, Take by mouth., Disp: , Rfl:     ondansetron (ZOFRAN-ODT) 4 MG TbDL, Take 1 tablet (4 mg total) by mouth every 8 (eight) hours as needed (NAUSEA). (Patient not taking: Reported on 3/19/2025), Disp: 30 tablet, Rfl: 1

## 2025-03-20 ENCOUNTER — RESULTS FOLLOW-UP (OUTPATIENT)
Dept: OBSTETRICS AND GYNECOLOGY | Facility: CLINIC | Age: 25
End: 2025-03-20

## 2025-04-02 ENCOUNTER — ROUTINE PRENATAL (OUTPATIENT)
Dept: OBSTETRICS AND GYNECOLOGY | Facility: CLINIC | Age: 25
End: 2025-04-02
Payer: MEDICAID

## 2025-04-02 VITALS
SYSTOLIC BLOOD PRESSURE: 100 MMHG | DIASTOLIC BLOOD PRESSURE: 64 MMHG | HEART RATE: 89 BPM | WEIGHT: 130.38 LBS | BODY MASS INDEX: 23.85 KG/M2

## 2025-04-02 DIAGNOSIS — Z3A.34 34 WEEKS GESTATION OF PREGNANCY: ICD-10-CM

## 2025-04-02 DIAGNOSIS — O99.013 ANEMIA DURING PREGNANCY IN THIRD TRIMESTER: ICD-10-CM

## 2025-04-02 DIAGNOSIS — Z34.83 ENCOUNTER FOR SUPERVISION OF OTHER NORMAL PREGNANCY IN THIRD TRIMESTER: Primary | ICD-10-CM

## 2025-04-02 LAB
BILIRUB UR QL STRIP: NEGATIVE
GLUCOSE UR QL STRIP: NEGATIVE
KETONES UR QL STRIP: POSITIVE
LEUKOCYTE ESTERASE UR QL STRIP: NEGATIVE
PH, POC UA: 7.5
POC BLOOD, URINE: NEGATIVE
POC NITRATES, URINE: NEGATIVE
PROT UR QL STRIP: POSITIVE
SP GR UR STRIP: 1.02 (ref 1–1.03)
UROBILINOGEN UR STRIP-ACNC: 1 (ref 0.1–1.1)

## 2025-04-02 NOTE — PROGRESS NOTES
HPI  Elizabeth Alegre is a 25 y.o.   34w5d here for prenatal visit. C/o pelvic pressure for past 2-3 days with irreg ctx.   Remains unsure about T-dap vaccine will let us know by next visit.  Denies any VB, ROM, and PIH sxs. Reports active fetal movements.     Elizabeth's allergies were reviewed and updated as appropriate.    History reviewed. No pertinent past medical history.  Family History   Problem Relation Name Age of Onset    Hypertension Father       Social History[1]  OB History    Para Term  AB Living   3 2 2 0 0 2   SAB IAB Ectopic Multiple Live Births   0 0 0  2      # Outcome Date GA Lbr Jaison/2nd Weight Sex Type Anes PTL Lv   3 Current            2 Term 21 39w0d  3.345 kg (7 lb 6 oz) M Vag-Spont EPI  DEBBIE   1 Term 19 39w0d  2.92 kg (6 lb 7 oz) M Vag-Spont EPI  DEBBIE     Current Medications[2]    ROS:  A comprehensive review of symptoms was completed and negative except as noted above.    Physical Exam:    Chaperone present for exam.    /64   Pulse 89   Wt 59.1 kg (130 lb 6.4 oz)   LMP 2024   BMI 23.85 kg/m²     Gen: No distress  Abdomen: Gravid, non-tender  Pelvic: see below  Extremities: No edema    Fundal Height (cm): 35 cm  Fetal Heart Rate: 132  Movement: Present  Dilation: Closed  Effacement (%): 0  Station: -3    Recent Results (from the past 24 hours)   POCT Urinalysis, Dipstick, Automated, W/O Scope    Collection Time: 25 11:23 AM   Result Value Ref Range    POC Blood, Urine Negative Negative    POC Bilirubin, Urine Negative Negative    POC Urobilinogen, Urine 1.0 0.1 - 1.1    POC Ketones, Urine Positive (A) Negative    POC Protein, Urine Positive (A) Negative    POC Nitrates, Urine Negative Negative    POC Glucose, Urine Negative Negative    pH, UA 7.5     POC Specific Gravity, Urine 1.020 1.003 - 1.029    POC Leukocytes, Urine Negative Negative     ABO/Rh:   Lab Results   Component Value Date/Time    GROUPTRH O POS 10/16/2024 08:15 AM        H&H:  Lab Results   Component Value Date/Time    HGB 10.0 (L) 03/19/2025 11:17 AM    HCT 30.3 (L) 03/19/2025 11:17 AM       Platelet:  Lab Results   Component Value Date/Time     03/19/2025 11:17 AM       Osulivan:   Lab Results   Component Value Date/Time    QZGN9DL 95 (L) 02/19/2025 11:00 AM       HIV:   Lab Results   Component Value Date/Time    HIV Nonreactive 10/16/2024 08:15 AM       RPR:  Lab Results   Component Value Date/Time    SYPHAB Nonreactive 02/19/2025 11:00 AM       Hepatitis B Surface Antigen:  Lab Results   Component Value Date/Time    HEPBSAG Negative 10/16/2024 08:15 AM       Hepatitis C:  Lab Results   Component Value Date/Time    HEPCAB Nonreactive 10/16/2024 08:15 AM       Rubella Immune Status:  Lab Results   Component Value Date/Time    RUBELLAIGG 61.20 10/16/2024 08:15 AM       Last PAP Date: 10/7/2024    ASSESSMENT/PLAN:    1. Encounter for supervision of other normal pregnancy in third trimester  -     Cancel: CBC Without Differential    2. 34 weeks gestation of pregnancy    3. Anemia during pregnancy in third trimester  -     POCT Urinalysis, Dipstick, Automated, W/O Scope    U/S FOR EFW NEXT VS    Labor unit and pre-eclampsia precautions given.  Fetal kick count instructions given to patient.     Follow Up:  Follow up in about 1 week (around 4/9/2025) for OB VS.             [1]   Social History  Tobacco Use    Smoking status: Never     Passive exposure: Never    Smokeless tobacco: Never   Substance Use Topics    Alcohol use: Not Currently     Comment: social    Drug use: Never   [2]   Current Outpatient Medications:     ascorbic acid, vitamin C, (VITAMIN C) 500 MG tablet, Take 1 tablet (500 mg total) by mouth 2 (two) times daily., Disp: 60 tablet, Rfl: 3    docusate sodium (COLACE) 100 MG capsule, Take 1 capsule (100 mg total) by mouth 2 (two) times daily., Disp: 60 capsule, Rfl: 3    ferrous sulfate 325 (65 FE) MG EC tablet, Take 1 tablet (325 mg total) by mouth 2 (two)  times daily., Disp: 60 tablet, Rfl: 3    folic acid (FOLVITE) 1 MG tablet, Take 1 tablet (1 mg total) by mouth once daily., Disp: 30 tablet, Rfl: 5    ondansetron (ZOFRAN-ODT) 4 MG TbDL, Take 1 tablet (4 mg total) by mouth every 8 (eight) hours as needed (NAUSEA)., Disp: 30 tablet, Rfl: 1    prenatal 21-iron fu-folic acid 14 mg iron- 400 mcg Tab, Take by mouth., Disp: , Rfl:

## 2025-04-04 ENCOUNTER — PATIENT MESSAGE (OUTPATIENT)
Dept: OTHER | Facility: OTHER | Age: 25
End: 2025-04-04
Payer: MEDICAID

## 2025-04-09 ENCOUNTER — ROUTINE PRENATAL (OUTPATIENT)
Dept: OBSTETRICS AND GYNECOLOGY | Facility: CLINIC | Age: 25
End: 2025-04-09
Payer: MEDICAID

## 2025-04-09 VITALS
DIASTOLIC BLOOD PRESSURE: 68 MMHG | WEIGHT: 129.5 LBS | HEART RATE: 91 BPM | BODY MASS INDEX: 23.69 KG/M2 | SYSTOLIC BLOOD PRESSURE: 100 MMHG

## 2025-04-09 DIAGNOSIS — Z34.83 ENCOUNTER FOR SUPERVISION OF OTHER NORMAL PREGNANCY IN THIRD TRIMESTER: Primary | ICD-10-CM

## 2025-04-09 DIAGNOSIS — O99.013 ANEMIA DURING PREGNANCY IN THIRD TRIMESTER: ICD-10-CM

## 2025-04-09 DIAGNOSIS — Z3A.35 35 WEEKS GESTATION OF PREGNANCY: ICD-10-CM

## 2025-04-09 LAB
BILIRUB UR QL STRIP: NEGATIVE
GLUCOSE UR QL STRIP: NEGATIVE
KETONES UR QL STRIP: POSITIVE
LEUKOCYTE ESTERASE UR QL STRIP: NEGATIVE
PH, POC UA: 7
POC BLOOD, URINE: NEGATIVE
POC NITRATES, URINE: NEGATIVE
PROT UR QL STRIP: NEGATIVE
SP GR UR STRIP: 1.01 (ref 1–1.03)
UROBILINOGEN UR STRIP-ACNC: 0.2 (ref 0.1–1.1)

## 2025-04-09 NOTE — PROGRESS NOTES
HPI  Elizabeth Alegre is a 25 y.o.   35w5d here for Routine Prenatal Visit   NO C/O'S. PT IS HERE FOR U/S TO CHECK ON WEIGHT OF THE BABY.   PT IS STILL UNSURE ON GETTING THE TDAP VACCINE.   Denies any VB, ROM, and PIH sxs. Reports active fetal movements.     Elizabeth's allergies were reviewed and updated as appropriate.    History reviewed. No pertinent past medical history.  Family History   Problem Relation Name Age of Onset    Hypertension Father       Social History[1]  OB History    Para Term  AB Living   3 3 3 0 0 3   SAB IAB Ectopic Multiple Live Births   0 0 0 0 3      # Outcome Date GA Lbr Jaison/2nd Weight Sex Type Anes PTL Lv   3 Term 25 38w1d 10:10 / 00:22 3.215 kg (7 lb 1.4 oz) M Vag-Spont EPI N DEBBIE   2 Term 21 39w0d  3.345 kg (7 lb 6 oz) M Vag-Spont EPI  DEBBIE   1 Term 19 39w0d  2.92 kg (6 lb 7 oz) M Vag-Spont EPI  DEBBIE     Current Medications[2]    ROS:  A comprehensive review of symptoms was completed and negative except as noted above.    Physical Exam:    Chaperone present for exam.    /68   Pulse 91   Wt 58.7 kg (129 lb 8 oz)   LMP 2024   BMI 23.69 kg/m²     Gen: No distress  Abdomen: Gravid, non-tender  Pelvic: DEFERRED  Extremities: No edema    Fetal Heart Rate: 133  Movement: Present    ASSESSMENT/PLAN:    1. Encounter for supervision of other normal pregnancy in third trimester  -     POCT Urinalysis, Dipstick, Automated, W/O Scope    2. 35 weeks gestation of pregnancy    3. Anemia during pregnancy in third trimester      Labor unit and pre-eclampsia precautions given.  Fetal kick count instructions given to patient.     Follow Up:  Follow up in about 1 week (around 2025) for OB VS.            [1]   Social History  Tobacco Use    Smoking status: Never     Passive exposure: Never    Smokeless tobacco: Never   Substance Use Topics    Alcohol use: Not Currently     Comment: social    Drug use: Never   [2]   Current Outpatient Medications:      prenatal 21-iron fu-folic acid 14 mg iron- 400 mcg Tab, Take by mouth., Disp: , Rfl:     ibuprofen (ADVIL,MOTRIN) 600 MG tablet, Take 1 tablet (600 mg total) by mouth every 6 (six) hours as needed for Pain., Disp: 30 tablet, Rfl: 0

## 2025-04-16 ENCOUNTER — ROUTINE PRENATAL (OUTPATIENT)
Dept: OBSTETRICS AND GYNECOLOGY | Facility: CLINIC | Age: 25
End: 2025-04-16
Payer: MEDICAID

## 2025-04-16 VITALS
WEIGHT: 129 LBS | SYSTOLIC BLOOD PRESSURE: 120 MMHG | DIASTOLIC BLOOD PRESSURE: 60 MMHG | BODY MASS INDEX: 23.59 KG/M2 | HEART RATE: 89 BPM

## 2025-04-16 DIAGNOSIS — Z3A.36 36 WEEKS GESTATION OF PREGNANCY: ICD-10-CM

## 2025-04-16 DIAGNOSIS — Z34.83 ENCOUNTER FOR SUPERVISION OF OTHER NORMAL PREGNANCY IN THIRD TRIMESTER: Primary | ICD-10-CM

## 2025-04-16 DIAGNOSIS — Z23 NEED FOR TDAP VACCINATION: ICD-10-CM

## 2025-04-16 DIAGNOSIS — O99.013 ANEMIA DURING PREGNANCY IN THIRD TRIMESTER: ICD-10-CM

## 2025-04-16 LAB
BILIRUB UR QL STRIP: NEGATIVE
GLUCOSE UR QL STRIP: NEGATIVE
KETONES UR QL STRIP: NEGATIVE
LEUKOCYTE ESTERASE UR QL STRIP: POSITIVE
PH, POC UA: 7
POC BLOOD, URINE: NEGATIVE
POC NITRATES, URINE: NEGATIVE
PROT UR QL STRIP: NEGATIVE
SP GR UR STRIP: 1.01 (ref 1–1.03)
UROBILINOGEN UR STRIP-ACNC: 0.2 (ref 0.1–1.1)

## 2025-04-16 PROCEDURE — 87653 STREP B DNA AMP PROBE: CPT | Performed by: OBSTETRICS & GYNECOLOGY

## 2025-04-16 NOTE — PROGRESS NOTES
HPI  Elizabeth Alegre is a 25 y.o.   36w5d here for.  Routine Prenatal Visit. She C/O mild, intermittent CONTRACTIONS. Denies any VB, ROM, and PIH sxs. Patient is also for GBS culture collection.    Patient would like T-dap today.    Elizabeth's allergies, medications, history, and problem list were updated as appropriate.    ROS:  A comprehensive review of symptoms was completed and negative except as noted above.    Physical Exam:  Chaperone present for exam.    /60   Pulse 89   Wt 58.5 kg (129 lb)   LMP 2024   BMI 23.59 kg/m²     Gen: No distress  Abdomen: Gravid, non-tender  Pelvic: GBS VAGINAL-RECTAL CULTURE COLLECTED.   Extremities: No edema    Fetal Heart Rate: 132  Movement: Present  Dilation: 2.5  Effacement (%): 70  Station: -2    Recent Results (from the past 24 hours)   POCT Urinalysis, Dipstick, Automated, W/O Scope    Collection Time: 25 10:42 AM   Result Value Ref Range    POC Blood, Urine Negative Negative    POC Bilirubin, Urine Negative Negative    POC Urobilinogen, Urine 0.2 0.1 - 1.1    POC Ketones, Urine Negative Negative    POC Protein, Urine Negative Negative    POC Nitrates, Urine Negative Negative    POC Glucose, Urine Negative Negative    pH, UA 7.0     POC Specific Gravity, Urine 1.010 1.003 - 1.029    POC Leukocytes, Urine Positive (A) Negative       ABO/Rh:   Lab Results   Component Value Date/Time    GROUPTRH O POS 10/16/2024 08:15 AM     H&H:  Lab Results   Component Value Date/Time    HGB 10.0 (L) 2025 11:17 AM    HCT 30.3 (L) 2025 11:17 AM     Platelet:  Lab Results   Component Value Date/Time     2025 11:17 AM     Osulivan:   Lab Results   Component Value Date/Time    WKHL6XD 95 (L) 2025 11:00 AM     HIV:   Lab Results   Component Value Date/Time    HIV Nonreactive 10/16/2024 08:15 AM     RPR:  Lab Results   Component Value Date/Time    SYPHAB Nonreactive 2025 11:00 AM     Hepatitis B Surface Antigen:  Lab Results    Component Value Date/Time    HEPBSAG Negative 10/16/2024 08:15 AM     Hepatitis C:  Lab Results   Component Value Date/Time    HEPCAB Nonreactive 10/16/2024 08:15 AM     Rubella Immune Status:  Lab Results   Component Value Date/Time    RUBELLAIGG 61.20 10/16/2024 08:15 AM     Last PAP Date: 10/7/2024    ASSESSMENT/PLAN:    1. Encounter for supervision of other normal pregnancy in third trimester  -     POCT Urinalysis, Dipstick, Automated, W/O Scope    2. 36 weeks gestation of pregnancy  -     Strep Group B by PCR    3. Anemia during pregnancy in third trimester    4. Need for Tdap vaccination  -     Tdap (BOOSTRIX) vaccine injection 0.5 mL      Labor unit and pre-eclampsia precautions given.  Fetal kick count instructions given to patient.     Follow Up:  Follow up in about 1 week (around 4/23/2025) for OB FOLLOW UP.

## 2025-04-17 ENCOUNTER — RESULTS FOLLOW-UP (OUTPATIENT)
Dept: OBSTETRICS AND GYNECOLOGY | Facility: CLINIC | Age: 25
End: 2025-04-17

## 2025-04-17 LAB — STREP B PCR (OHS): DETECTED

## 2025-04-21 ENCOUNTER — HOSPITAL ENCOUNTER (OUTPATIENT)
Facility: HOSPITAL | Age: 25
Discharge: HOME | End: 2025-04-21
Attending: OBSTETRICS & GYNECOLOGY | Admitting: OBSTETRICS & GYNECOLOGY
Payer: MEDICAID

## 2025-04-21 VITALS — HEART RATE: 97 BPM | OXYGEN SATURATION: 97 % | DIASTOLIC BLOOD PRESSURE: 74 MMHG | SYSTOLIC BLOOD PRESSURE: 130 MMHG

## 2025-04-21 DIAGNOSIS — Z36.89 ENCOUNTER FOR TRIAGE IN PREGNANT PATIENT: ICD-10-CM

## 2025-04-21 LAB
BILIRUB UR QL STRIP.AUTO: NEGATIVE
CLARITY UR: CLEAR
COLOR UR AUTO: YELLOW
GLUCOSE UR QL STRIP: NEGATIVE
HGB UR QL STRIP: NEGATIVE
KETONES UR QL STRIP: NEGATIVE
LEUKOCYTE ESTERASE UR QL STRIP: NEGATIVE
NITRITE UR QL STRIP: NEGATIVE
PH UR STRIP: 6 [PH]
PROT UR QL STRIP: NEGATIVE
ROMPLUS TEST: NEGATIVE
SP GR UR STRIP.AUTO: <=1.005 (ref 1–1.03)
UROBILINOGEN UR STRIP-ACNC: 0.2

## 2025-04-21 PROCEDURE — 81003 URINALYSIS AUTO W/O SCOPE: CPT | Performed by: OBSTETRICS & GYNECOLOGY

## 2025-04-21 PROCEDURE — 25000003 PHARM REV CODE 250: Performed by: OBSTETRICS & GYNECOLOGY

## 2025-04-21 PROCEDURE — 99211 OFF/OP EST MAY X REQ PHY/QHP: CPT

## 2025-04-21 PROCEDURE — 84112 EVAL AMNIOTIC FLUID PROTEIN: CPT | Performed by: OBSTETRICS & GYNECOLOGY

## 2025-04-21 RX ORDER — ONDANSETRON 4 MG/1
8 TABLET, ORALLY DISINTEGRATING ORAL EVERY 8 HOURS PRN
Status: DISCONTINUED | OUTPATIENT
Start: 2025-04-21 | End: 2025-04-21 | Stop reason: HOSPADM

## 2025-04-21 RX ORDER — HYDROXYZINE PAMOATE 25 MG/1
25 CAPSULE ORAL EVERY 8 HOURS PRN
Status: DISCONTINUED | OUTPATIENT
Start: 2025-04-21 | End: 2025-04-21 | Stop reason: HOSPADM

## 2025-04-21 RX ORDER — ACETAMINOPHEN 500 MG
500 TABLET ORAL EVERY 6 HOURS PRN
Status: DISCONTINUED | OUTPATIENT
Start: 2025-04-21 | End: 2025-04-21 | Stop reason: HOSPADM

## 2025-04-21 RX ORDER — SODIUM CHLORIDE, SODIUM LACTATE, POTASSIUM CHLORIDE, CALCIUM CHLORIDE 600; 310; 30; 20 MG/100ML; MG/100ML; MG/100ML; MG/100ML
INJECTION, SOLUTION INTRAVENOUS CONTINUOUS
Status: DISCONTINUED | OUTPATIENT
Start: 2025-04-21 | End: 2025-04-21 | Stop reason: HOSPADM

## 2025-04-21 RX ADMIN — ACETAMINOPHEN 500 MG: 500 TABLET, FILM COATED ORAL at 05:04

## 2025-04-21 RX ADMIN — HYDROXYZINE PAMOATE 25 MG: 25 CAPSULE ORAL at 05:04

## 2025-04-21 NOTE — NURSING
1322-PT ARRIVED TO OB UNIT WITH C/O CONTRACTIONS. UA COLLECTED. PLACED ON EFM. PT REPORTS ACTIVE FETAL MOVEMENT AND DENIES LEAKING OF FLUID AND VAGINAL BLEEDING. SVE PERFORMED.     1550-SVE PERFORMED. SMALL AMOUNT OF CERVICAL CHANGE NOTED. WILL CONTINUE TO MONITOR PT LONGER TO ASSESS FOR ACTIVE LABOR.    1645-SVE PERFORMED. NO CERVICAL CHANGE NOTED.     1710-DR. TREVINO NOTIFIED OF PT COMPLAINT, VITALS, CONTRACTION PATTERN, UA RESULTS, VAG EXAM AND REACTIVE NST WITH ONE MILD VARIABLE DECELTHAT OCCURRED EARLIER IN MONITORING AND RESOLVED WITH NO INTERVENTIONS. ORDERS GIVEN TO D/C PT HOME WITH LABOR PRECAUTIONS AND HAVE HER KEEP HER SCHEDULED APPT ON WED.    1714-PT GIVEN TYLENOL AND VISTARIL FOR BACKACHE.     1725-PT EDUCATED ON REPORTABLE S/S AND WHEN TO RETURN TO HOSPITAL. ALLOWED TIME FOR QUESTIONS. PT VERBALIZED UNDERSTANDING. PT LEFT IN STABLE CONDITION VIA AMBULATION.

## 2025-04-23 ENCOUNTER — ROUTINE PRENATAL (OUTPATIENT)
Dept: OBSTETRICS AND GYNECOLOGY | Facility: CLINIC | Age: 25
End: 2025-04-23
Payer: MEDICAID

## 2025-04-23 VITALS
WEIGHT: 133 LBS | HEART RATE: 95 BPM | SYSTOLIC BLOOD PRESSURE: 100 MMHG | DIASTOLIC BLOOD PRESSURE: 60 MMHG | BODY MASS INDEX: 24.33 KG/M2

## 2025-04-23 DIAGNOSIS — Z3A.37 37 WEEKS GESTATION OF PREGNANCY: ICD-10-CM

## 2025-04-23 DIAGNOSIS — Z34.83 ENCOUNTER FOR SUPERVISION OF OTHER NORMAL PREGNANCY IN THIRD TRIMESTER: Primary | ICD-10-CM

## 2025-04-23 DIAGNOSIS — O99.013 ANEMIA DURING PREGNANCY IN THIRD TRIMESTER: ICD-10-CM

## 2025-04-23 LAB
BILIRUB UR QL STRIP: NEGATIVE
GLUCOSE UR QL STRIP: NEGATIVE
KETONES UR QL STRIP: POSITIVE
LEUKOCYTE ESTERASE UR QL STRIP: POSITIVE
PH, POC UA: 6
POC BLOOD, URINE: NEGATIVE
POC NITRATES, URINE: NEGATIVE
PROT UR QL STRIP: NEGATIVE
SP GR UR STRIP: 1.02 (ref 1–1.03)
UROBILINOGEN UR STRIP-ACNC: 0.2 (ref 0.1–1.1)

## 2025-04-23 PROCEDURE — 99214 OFFICE O/P EST MOD 30 MIN: CPT | Mod: TH,,, | Performed by: OBSTETRICS & GYNECOLOGY

## 2025-04-23 NOTE — PROGRESS NOTES
HPI  Elizabeth Alegre is a 25 y.o.   37w5d here for Routine Prenatal Visit. COMPLAINS OF PELVIC PRESSURE AND CRAMPING. WENT TO L&D ON , STATES SHE WAS HAVING CONTRACTIONS BUT NOT CONSISTENT ENOUGH TO ADMIT PATIENT. CERVIX DID NOT CHANGE FROM PRIOR EXAM. STILL 2.5CM.     Denies any VB, ROM, and PIH sxs. Reports active fetal movements.     Elizabeth's allergies were reviewed and updated as appropriate.    History reviewed. No pertinent past medical history.  Family History   Problem Relation Name Age of Onset    Hypertension Father       Social History[1]  OB History    Para Term  AB Living   3 2 2 0 0 2   SAB IAB Ectopic Multiple Live Births   0 0 0  2      # Outcome Date GA Lbr Jaison/2nd Weight Sex Type Anes PTL Lv   3 Current            2 Term 21 39w0d  3.345 kg (7 lb 6 oz) M Vag-Spont EPI  DEBBIE   1 Term 19 39w0d  2.92 kg (6 lb 7 oz) M Vag-Spont EPI  DEBBIE     Current Medications[2]    ROS:  A comprehensive review of symptoms was completed and negative except as noted above.    Physical Exam:    Chaperone present for exam.    /60   Pulse 95   Wt 60.3 kg (133 lb)   LMP 2024   BMI 24.33 kg/m²     Gen: No distress  Abdomen: Gravid, non-tender  Pelvic: DEFERRED  Extremities: No edema    Fetal Heart Rate: 143  Movement: Present  Dilation: 2.5  Effacement (%): 70  Station: -2    Recent Results (from the past 24 hours)   POCT Urinalysis, Dipstick, Automated, W/O Scope    Collection Time: 25 11:08 AM   Result Value Ref Range    POC Blood, Urine Negative Negative    POC Bilirubin, Urine Negative Negative    POC Urobilinogen, Urine 0.2 0.1 - 1.1    POC Ketones, Urine Positive (A) Negative    POC Protein, Urine Negative Negative    POC Nitrates, Urine Negative Negative    POC Glucose, Urine Negative Negative    pH, UA 6.0     POC Specific Gravity, Urine 1.020 1.003 - 1.029    POC Leukocytes, Urine Positive (A) Negative     ABO/Rh:   Lab Results   Component Value Date/Time     GROUPTRH O POS 10/16/2024 08:15 AM       H&H:  Lab Results   Component Value Date/Time    HGB 10.0 (L) 03/19/2025 11:17 AM    HCT 30.3 (L) 03/19/2025 11:17 AM       Platelet:  Lab Results   Component Value Date/Time     03/19/2025 11:17 AM       Osulivan:   Lab Results   Component Value Date/Time    DHZP2KC 95 (L) 02/19/2025 11:00 AM       HIV:   Lab Results   Component Value Date/Time    HIV Nonreactive 10/16/2024 08:15 AM       RPR:  Lab Results   Component Value Date/Time    SYPHAB Nonreactive 02/19/2025 11:00 AM       Hepatitis B Surface Antigen:  Lab Results   Component Value Date/Time    HEPBSAG Negative 10/16/2024 08:15 AM       Hepatitis C:  Lab Results   Component Value Date/Time    HEPCAB Nonreactive 10/16/2024 08:15 AM       Rubella Immune Status:  Lab Results   Component Value Date/Time    RUBELLAIGG 61.20 10/16/2024 08:15 AM       GBS:  Lab Results   Component Value Date/Time    SREPBPCR Detected (A) 04/16/2025 10:39 AM        Last PAP Date: 10/7/2024    ASSESSMENT/PLAN:    1. Encounter for supervision of other normal pregnancy in third trimester  -     POCT Urinalysis, Dipstick, Automated, W/O Scope    2. 37 weeks gestation of pregnancy    3. Anemia during pregnancy in third trimester        Labor unit and pre-eclampsia precautions given.  Fetal kick count instructions given to patient.     Follow Up:  Follow up in about 1 week (around 4/30/2025) for OB VS.            [1]   Social History  Tobacco Use    Smoking status: Never     Passive exposure: Never    Smokeless tobacco: Never   Substance Use Topics    Alcohol use: Not Currently     Comment: social    Drug use: Never   [2]   Current Outpatient Medications:     prenatal 21-iron fu-folic acid 14 mg iron- 400 mcg Tab, Take by mouth., Disp: , Rfl:

## 2025-04-25 ENCOUNTER — HOSPITAL ENCOUNTER (INPATIENT)
Facility: HOSPITAL | Age: 25
LOS: 2 days | Discharge: HOME | End: 2025-04-27
Attending: OBSTETRICS & GYNECOLOGY | Admitting: OBSTETRICS & GYNECOLOGY
Payer: MEDICAID

## 2025-04-25 ENCOUNTER — ANESTHESIA (OUTPATIENT)
Dept: OBSTETRICS AND GYNECOLOGY | Facility: HOSPITAL | Age: 25
End: 2025-04-25
Payer: MEDICAID

## 2025-04-25 ENCOUNTER — ANESTHESIA EVENT (OUTPATIENT)
Dept: OBSTETRICS AND GYNECOLOGY | Facility: HOSPITAL | Age: 25
End: 2025-04-25
Payer: MEDICAID

## 2025-04-25 DIAGNOSIS — O47.9 IRREGULAR UTERINE CONTRACTIONS: ICD-10-CM

## 2025-04-25 LAB
ALBUMIN SERPL-MCNC: 3.4 G/DL (ref 3.4–5)
ALBUMIN/GLOB SERPL: 1.3 RATIO
ALP SERPL-CCNC: 275 UNIT/L (ref 50–144)
ALT SERPL-CCNC: 19 UNIT/L (ref 1–45)
ANION GAP SERPL CALC-SCNC: 9 MEQ/L (ref 2–13)
AST SERPL-CCNC: 38 UNIT/L (ref 14–36)
BASOPHILS # BLD AUTO: 0.02 X10(3)/MCL (ref 0.01–0.08)
BASOPHILS NFR BLD AUTO: 0.2 % (ref 0.1–1.2)
BILIRUB SERPL-MCNC: 0.8 MG/DL (ref 0–1)
BILIRUB UR QL STRIP.AUTO: NEGATIVE
BUN SERPL-MCNC: 6 MG/DL (ref 7–20)
CALCIUM SERPL-MCNC: 8.2 MG/DL (ref 8.4–10.2)
CHLORIDE SERPL-SCNC: 109 MMOL/L (ref 98–110)
CLARITY UR: CLEAR
CO2 SERPL-SCNC: 17 MMOL/L (ref 21–32)
COLOR UR AUTO: YELLOW
CREAT SERPL-MCNC: 0.53 MG/DL (ref 0.66–1.25)
CREAT/UREA NIT SERPL: 11 (ref 12–20)
EOSINOPHIL # BLD AUTO: 0.03 X10(3)/MCL (ref 0.04–0.36)
EOSINOPHIL NFR BLD AUTO: 0.2 % (ref 0.7–7)
ERYTHROCYTE [DISTWIDTH] IN BLOOD BY AUTOMATED COUNT: 14.5 % (ref 11–14.5)
GFR SERPLBLD CREATININE-BSD FMLA CKD-EPI: >90 ML/MIN/1.73/M2
GLOBULIN SER-MCNC: 2.6 GM/DL (ref 2–3.9)
GLUCOSE SERPL-MCNC: 63 MG/DL (ref 70–115)
GLUCOSE UR QL STRIP: NEGATIVE
GROUP & RH: NORMAL
HBV SURFACE AG SERPL QL IA: NEGATIVE
HBV SURFACE AG SERPL QL IA: NORMAL
HCT VFR BLD AUTO: 29.6 % (ref 36–48)
HGB BLD-MCNC: 9.6 G/DL (ref 11.8–16)
HGB UR QL STRIP: NEGATIVE
HIV 1+2 AB+HIV1 P24 AG SERPL QL IA: NONREACTIVE
IMM GRANULOCYTES # BLD AUTO: 0.32 X10(3)/MCL (ref 0–0.03)
IMM GRANULOCYTES NFR BLD AUTO: 2.6 % (ref 0–0.5)
INDIRECT COOMBS: NORMAL
KETONES UR QL STRIP: >=80
LEUKOCYTE ESTERASE UR QL STRIP: NEGATIVE
LYMPHOCYTES # BLD AUTO: 0.88 X10(3)/MCL (ref 1.16–3.74)
LYMPHOCYTES NFR BLD AUTO: 7.2 % (ref 20–55)
MCH RBC QN AUTO: 25.7 PG (ref 27–34)
MCHC RBC AUTO-ENTMCNC: 32.4 G/DL (ref 31–37)
MCV RBC AUTO: 79.4 FL (ref 79–99)
MONOCYTES # BLD AUTO: 0.75 X10(3)/MCL (ref 0.24–0.36)
MONOCYTES NFR BLD AUTO: 6.1 % (ref 4.7–12.5)
NEUTROPHILS # BLD AUTO: 10.27 X10(3)/MCL (ref 1.56–6.13)
NEUTROPHILS NFR BLD AUTO: 83.7 % (ref 37–73)
NITRITE UR QL STRIP: NEGATIVE
NRBC BLD AUTO-RTO: 0 %
PH UR STRIP: 6 [PH]
PLATELET # BLD AUTO: 184 X10(3)/MCL (ref 140–371)
PMV BLD AUTO: 10.9 FL (ref 9.4–12.4)
POTASSIUM SERPL-SCNC: 3.4 MMOL/L (ref 3.5–5.1)
PROT SERPL-MCNC: 6 GM/DL (ref 6.3–8.2)
PROT UR QL STRIP: NEGATIVE
RBC # BLD AUTO: 3.73 X10(6)/MCL (ref 4–5.1)
SODIUM SERPL-SCNC: 135 MMOL/L (ref 136–145)
SP GR UR STRIP.AUTO: <=1.005 (ref 1–1.03)
SPECIMEN OUTDATE: NORMAL
T PALLIDUM AB SER QL: NONREACTIVE
UROBILINOGEN UR STRIP-ACNC: 0.2
WBC # BLD AUTO: 12.27 X10(3)/MCL (ref 4–11.5)

## 2025-04-25 PROCEDURE — 63600175 PHARM REV CODE 636 W HCPCS: Performed by: OBSTETRICS & GYNECOLOGY

## 2025-04-25 PROCEDURE — 25000003 PHARM REV CODE 250: Performed by: OBSTETRICS & GYNECOLOGY

## 2025-04-25 PROCEDURE — 85025 COMPLETE CBC W/AUTO DIFF WBC: CPT | Performed by: OBSTETRICS & GYNECOLOGY

## 2025-04-25 PROCEDURE — 36415 COLL VENOUS BLD VENIPUNCTURE: CPT | Performed by: OBSTETRICS & GYNECOLOGY

## 2025-04-25 PROCEDURE — 86901 BLOOD TYPING SEROLOGIC RH(D): CPT | Performed by: OBSTETRICS & GYNECOLOGY

## 2025-04-25 PROCEDURE — 80053 COMPREHEN METABOLIC PANEL: CPT | Performed by: OBSTETRICS & GYNECOLOGY

## 2025-04-25 PROCEDURE — 11000001 HC ACUTE MED/SURG PRIVATE ROOM

## 2025-04-25 PROCEDURE — 72100002 HC LABOR CARE, 1ST 8 HOURS

## 2025-04-25 PROCEDURE — 87340 HEPATITIS B SURFACE AG IA: CPT | Performed by: OBSTETRICS & GYNECOLOGY

## 2025-04-25 PROCEDURE — 62326 NJX INTERLAMINAR LMBR/SAC: CPT | Performed by: NURSE ANESTHETIST, CERTIFIED REGISTERED

## 2025-04-25 PROCEDURE — 86780 TREPONEMA PALLIDUM: CPT | Performed by: OBSTETRICS & GYNECOLOGY

## 2025-04-25 PROCEDURE — 10907ZC DRAINAGE OF AMNIOTIC FLUID, THERAPEUTIC FROM PRODUCTS OF CONCEPTION, VIA NATURAL OR ARTIFICIAL OPENING: ICD-10-PCS | Performed by: OBSTETRICS & GYNECOLOGY

## 2025-04-25 PROCEDURE — 0HQ9XZZ REPAIR PERINEUM SKIN, EXTERNAL APPROACH: ICD-10-PCS | Performed by: OBSTETRICS & GYNECOLOGY

## 2025-04-25 PROCEDURE — 59409 OBSTETRICAL CARE: CPT | Mod: ,,, | Performed by: NURSE ANESTHETIST, CERTIFIED REGISTERED

## 2025-04-25 PROCEDURE — 51702 INSERT TEMP BLADDER CATH: CPT

## 2025-04-25 PROCEDURE — 81003 URINALYSIS AUTO W/O SCOPE: CPT | Performed by: OBSTETRICS & GYNECOLOGY

## 2025-04-25 PROCEDURE — 3E033VJ INTRODUCTION OF OTHER HORMONE INTO PERIPHERAL VEIN, PERCUTANEOUS APPROACH: ICD-10-PCS | Performed by: OBSTETRICS & GYNECOLOGY

## 2025-04-25 PROCEDURE — 87389 HIV-1 AG W/HIV-1&-2 AB AG IA: CPT | Performed by: OBSTETRICS & GYNECOLOGY

## 2025-04-25 RX ORDER — CEFAZOLIN 2 G/1
2 INJECTION, POWDER, FOR SOLUTION INTRAMUSCULAR; INTRAVENOUS ONCE AS NEEDED
Status: DISCONTINUED | OUTPATIENT
Start: 2025-04-25 | End: 2025-04-27

## 2025-04-25 RX ORDER — SODIUM CHLORIDE, SODIUM LACTATE, POTASSIUM CHLORIDE, CALCIUM CHLORIDE 600; 310; 30; 20 MG/100ML; MG/100ML; MG/100ML; MG/100ML
INJECTION, SOLUTION INTRAVENOUS CONTINUOUS
Status: DISCONTINUED | OUTPATIENT
Start: 2025-04-25 | End: 2025-04-27

## 2025-04-25 RX ORDER — LIDOCAINE HYDROCHLORIDE AND EPINEPHRINE 15; 5 MG/ML; UG/ML
INJECTION, SOLUTION EPIDURAL
Status: DISCONTINUED | OUTPATIENT
Start: 2025-04-25 | End: 2025-04-26

## 2025-04-25 RX ORDER — OXYTOCIN-SODIUM CHLORIDE 0.9% IV SOLN 30 UNIT/500ML 30-0.9/5 UT/ML-%
0-32 SOLUTION INTRAVENOUS CONTINUOUS
Status: DISCONTINUED | OUTPATIENT
Start: 2025-04-25 | End: 2025-04-27

## 2025-04-25 RX ORDER — ACETAMINOPHEN 500 MG
500 TABLET ORAL EVERY 6 HOURS PRN
Status: DISCONTINUED | OUTPATIENT
Start: 2025-04-25 | End: 2025-04-27

## 2025-04-25 RX ORDER — OXYTOCIN-SODIUM CHLORIDE 0.9% IV SOLN 30 UNIT/500ML 30-0.9/5 UT/ML-%
95 SOLUTION INTRAVENOUS CONTINUOUS PRN
OUTPATIENT
Start: 2025-04-25

## 2025-04-25 RX ORDER — PENICILLIN G 3000000 [IU]/50ML
3 INJECTION, SOLUTION INTRAVENOUS
Status: DISCONTINUED | OUTPATIENT
Start: 2025-04-25 | End: 2025-04-27

## 2025-04-25 RX ORDER — OXYTOCIN-SODIUM CHLORIDE 0.9% IV SOLN 30 UNIT/500ML 30-0.9/5 UT/ML-%
10 SOLUTION INTRAVENOUS ONCE AS NEEDED
OUTPATIENT
Start: 2025-04-25 | End: 2036-09-21

## 2025-04-25 RX ORDER — ONDANSETRON 4 MG/1
8 TABLET, ORALLY DISINTEGRATING ORAL EVERY 8 HOURS PRN
Status: DISCONTINUED | OUTPATIENT
Start: 2025-04-25 | End: 2025-04-27

## 2025-04-25 RX ORDER — CALCIUM CARBONATE 200(500)MG
500 TABLET,CHEWABLE ORAL ONCE
Status: COMPLETED | OUTPATIENT
Start: 2025-04-25 | End: 2025-04-25

## 2025-04-25 RX ORDER — DEXMEDETOMIDINE HYDROCHLORIDE 100 UG/ML
5 INJECTION, SOLUTION INTRAVENOUS ONCE
Status: COMPLETED | OUTPATIENT
Start: 2025-04-25 | End: 2025-04-25

## 2025-04-25 RX ORDER — ROPIVACAINE HYDROCHLORIDE 2 MG/ML
12 INJECTION, SOLUTION EPIDURAL; INFILTRATION CONTINUOUS
Status: DISCONTINUED | OUTPATIENT
Start: 2025-04-25 | End: 2025-04-27 | Stop reason: HOSPADM

## 2025-04-25 RX ORDER — LIDOCAINE HCL/EPINEPHRINE/PF 2%-1:200K
1 VIAL (ML) INJECTION ONCE
Status: COMPLETED | OUTPATIENT
Start: 2025-04-25 | End: 2025-04-25

## 2025-04-25 RX ADMIN — DEXMEDETOMIDINE 5 MCG: 200 INJECTION, SOLUTION INTRAVENOUS at 05:04

## 2025-04-25 RX ADMIN — SODIUM CHLORIDE, POTASSIUM CHLORIDE, SODIUM LACTATE AND CALCIUM CHLORIDE 1000 ML: 600; 310; 30; 20 INJECTION, SOLUTION INTRAVENOUS at 01:04

## 2025-04-25 RX ADMIN — LIDOCAINE HYDROCHLORIDE AND EPINEPHRINE 2 ML: 15; 5 INJECTION, SOLUTION EPIDURAL at 04:04

## 2025-04-25 RX ADMIN — ROPIVACAINE HYDROCHLORIDE 10 ML/HR: 2 INJECTION, SOLUTION EPIDURAL; INFILTRATION at 05:04

## 2025-04-25 RX ADMIN — LIDOCAINE HYDROCHLORIDE AND EPINEPHRINE 3 ML: 20; 5 INJECTION, SOLUTION EPIDURAL; INFILTRATION; INTRACAUDAL; PERINEURAL at 04:04

## 2025-04-25 RX ADMIN — PENICILLIN G 3 MILLION UNITS: 3000000 INJECTION, SOLUTION INTRAVENOUS at 09:04

## 2025-04-25 RX ADMIN — PENICILLIN G 3 MILLION UNITS: 3000000 INJECTION, SOLUTION INTRAVENOUS at 05:04

## 2025-04-25 RX ADMIN — ACETAMINOPHEN 500 MG: 500 TABLET, FILM COATED ORAL at 01:04

## 2025-04-25 RX ADMIN — SODIUM CHLORIDE, POTASSIUM CHLORIDE, SODIUM LACTATE AND CALCIUM CHLORIDE: 600; 310; 30; 20 INJECTION, SOLUTION INTRAVENOUS at 06:04

## 2025-04-25 RX ADMIN — ACETAMINOPHEN 500 MG: 500 TABLET, FILM COATED ORAL at 07:04

## 2025-04-25 RX ADMIN — LIDOCAINE HYDROCHLORIDE AND EPINEPHRINE 3 ML: 15; 5 INJECTION, SOLUTION EPIDURAL at 04:04

## 2025-04-25 RX ADMIN — CALCIUM CARBONATE (ANTACID) CHEW TAB 500 MG 500 MG: 500 CHEW TAB at 07:04

## 2025-04-25 RX ADMIN — Medication 4 MILLI-UNITS/MIN: at 09:04

## 2025-04-25 RX ADMIN — DEXTROSE MONOHYDRATE 5 MILLION UNITS: 5 INJECTION INTRAVENOUS at 01:04

## 2025-04-25 NOTE — ANESTHESIA PROCEDURE NOTES
CSE    Patient location during procedure: OB  Start time: 4/25/2025 4:37 PM  Timeout: 4/25/2025 4:25 PM  End time: 4/25/2025 4:51 PM    Reason for block: labor analgesia requested by patient and obstetrician    Staffing  Authorizing Provider: Cesario Rojo CRNA  Performing Provider: Cesario Rojo CRNA    Staffing  Performed by: Cesario Rojo CRNA  Authorized by: Cesario Rojo CRNA    Preanesthetic Checklist  Completed: patient identified, IV checked, site marked, risks and benefits discussed, surgical consent, monitors and equipment checked, pre-op evaluation and timeout performed  CSE  Patient position: sitting  Prep: ChloraPrep  Patient monitoring: continuous pulse ox and frequent blood pressure checks  Approach: midline  Spinal Needle  Needle type: pencil-tip   Needle gauge: 25 G  Needle length: 3.5 in  Epidural Needle  Injection technique: KELLY air  Needle type: Tuohy   Needle gauge: 17 G  Needle length: 3.5 in  Needle insertion depth: 3.5 cm  Location: L4-5  Needle localization: anatomical landmarks   Catheter  Catheter type: trueAnthem  Catheter size: 19 G  Catheter at skin depth: 12 cm  Test dose: lidocaine 1.5% with Epi 1-to-200,000  Test dose: 2 mL  Additional Documentation: incremental injection, negative aspiration for CSF, negative aspiration for heme, no paresthesia on injection and negative test dose

## 2025-04-25 NOTE — ANESTHESIA PREPROCEDURE EVALUATION
04/25/2025  Elizabeth Alegre is a 25 y.o., female.    No medical history recorded     Surgical History     TUBES IN EARS AS CHILD      Substance History     Smoking Status: Never   Passive Exposure: Never   Smokeless Tobacco Status: Never   Alcohol use: Not Currently   Drug use: Never     Problem List   Current as of 04/25/25 1629  No problems recorded     Pre-op Assessment    I have reviewed the Patient Summary Reports.     I have reviewed the Nursing Notes. I have reviewed the NPO Status.   I have reviewed the Medications.     Review of Systems  Anesthesia Hx:  No problems with previous Anesthesia             Denies Family Hx of Anesthesia complications.    Denies Personal Hx of Anesthesia complications.                    Social:  Non-Smoker       Hematology/Oncology:    Oncology Normal    -- Anemia:                                  EENT/Dental:  EENT/Dental Normal           Cardiovascular:  Cardiovascular Normal Exercise tolerance: good                                             Pulmonary:  Pulmonary Normal                       Renal/:  Renal/ Normal                 Hepatic/GI:     GERD, well controlled                Musculoskeletal:  Musculoskeletal Normal                Neurological:  Neurology Normal                                      Endocrine:  Endocrine Normal            Dermatological:  Skin Normal    Psych:  Psychiatric Normal                    Physical Exam  General: Well nourished, Cooperative, Alert and Oriented    Airway:  Mallampati: II / II  Mouth Opening: Normal  TM Distance: Normal  Tongue: Normal  Neck ROM: Normal ROM    Dental:  Intact        Anesthesia Plan  Type of Anesthesia, risks & benefits discussed:    Anesthesia Type: CSE  Intra-op Monitoring Plan: Standard ASA Monitors  Post Op Pain Control Plan: epidural analgesia  Informed Consent: Informed consent signed with the  Patient and all parties understand the risks and agree with anesthesia plan.  All questions answered. Patient consented to blood products? Yes  ASA Score: 2  Day of Surgery Review of History & Physical: H&P Update referred to the surgeon/provider.I have interviewed and examined the patient. I have reviewed the patient's H&P dated: There are no significant changes.     Ready For Surgery From Anesthesia Perspective.     .

## 2025-04-25 NOTE — NURSING
PATIENT PRESENTED TO OB DEPARTMENT FROM HOME COMPLAINING OF CONTRACTIONS SINCE LAST NIGHT INCREASING IN PAIN AND EVERY 5 MINUTES APART. CLEAN CATCH UA COLLECTED. SVE DONE. CERVIX 2.5/70/-2. PATIENT PLACED ON TOCO AND EFM MONITOR. PATIENT DENIES VAGINAL BLEEDING OR LEAKING OF FLUID. PATIENT STATES ACTIVE FETAL MOVEMENT.

## 2025-04-26 LAB
BASOPHILS # BLD AUTO: 0.03 X10(3)/MCL (ref 0.01–0.08)
BASOPHILS NFR BLD AUTO: 0.2 % (ref 0.1–1.2)
EOSINOPHIL # BLD AUTO: 0.05 X10(3)/MCL (ref 0.04–0.36)
EOSINOPHIL NFR BLD AUTO: 0.4 % (ref 0.7–7)
ERYTHROCYTE [DISTWIDTH] IN BLOOD BY AUTOMATED COUNT: 14.5 % (ref 11–14.5)
HCT VFR BLD AUTO: 28.9 % (ref 36–48)
HGB BLD-MCNC: 9.5 G/DL (ref 11.8–16)
IMM GRANULOCYTES # BLD AUTO: 0.36 X10(3)/MCL (ref 0–0.03)
IMM GRANULOCYTES NFR BLD AUTO: 2.8 % (ref 0–0.5)
LYMPHOCYTES # BLD AUTO: 1.48 X10(3)/MCL (ref 1.16–3.74)
LYMPHOCYTES NFR BLD AUTO: 11.4 % (ref 20–55)
MCH RBC QN AUTO: 25.8 PG (ref 27–34)
MCHC RBC AUTO-ENTMCNC: 32.9 G/DL (ref 31–37)
MCV RBC AUTO: 78.5 FL (ref 79–99)
MONOCYTES # BLD AUTO: 1.02 X10(3)/MCL (ref 0.24–0.36)
MONOCYTES NFR BLD AUTO: 7.9 % (ref 4.7–12.5)
NEUTROPHILS # BLD AUTO: 10.01 X10(3)/MCL (ref 1.56–6.13)
NEUTROPHILS NFR BLD AUTO: 77.3 % (ref 37–73)
NRBC BLD AUTO-RTO: 0 %
PLATELET # BLD AUTO: 170 X10(3)/MCL (ref 140–371)
PMV BLD AUTO: 10.5 FL (ref 9.4–12.4)
RBC # BLD AUTO: 3.68 X10(6)/MCL (ref 4–5.1)
WBC # BLD AUTO: 12.95 X10(3)/MCL (ref 4–11.5)

## 2025-04-26 PROCEDURE — 25000003 PHARM REV CODE 250: Performed by: OBSTETRICS & GYNECOLOGY

## 2025-04-26 PROCEDURE — 63600175 PHARM REV CODE 636 W HCPCS: Performed by: OBSTETRICS & GYNECOLOGY

## 2025-04-26 PROCEDURE — 36415 COLL VENOUS BLD VENIPUNCTURE: CPT | Performed by: OBSTETRICS & GYNECOLOGY

## 2025-04-26 PROCEDURE — 11000001 HC ACUTE MED/SURG PRIVATE ROOM

## 2025-04-26 PROCEDURE — 85025 COMPLETE CBC W/AUTO DIFF WBC: CPT | Performed by: OBSTETRICS & GYNECOLOGY

## 2025-04-26 RX ORDER — OXYTOCIN-SODIUM CHLORIDE 0.9% IV SOLN 30 UNIT/500ML 30-0.9/5 UT/ML-%
95 SOLUTION INTRAVENOUS CONTINUOUS PRN
Status: DISCONTINUED | OUTPATIENT
Start: 2025-04-26 | End: 2025-04-27

## 2025-04-26 RX ORDER — PRENATAL WITH FERROUS FUM AND FOLIC ACID 3080; 920; 120; 400; 22; 1.84; 3; 20; 10; 1; 12; 200; 27; 25; 2 [IU]/1; [IU]/1; MG/1; [IU]/1; MG/1; MG/1; MG/1; MG/1; MG/1; MG/1; UG/1; MG/1; MG/1; MG/1; MG/1
1 TABLET ORAL DAILY
Status: DISCONTINUED | OUTPATIENT
Start: 2025-04-26 | End: 2025-04-27 | Stop reason: HOSPADM

## 2025-04-26 RX ORDER — DIPHENHYDRAMINE HCL 25 MG
25 CAPSULE ORAL EVERY 4 HOURS PRN
Status: DISCONTINUED | OUTPATIENT
Start: 2025-04-26 | End: 2025-04-27 | Stop reason: HOSPADM

## 2025-04-26 RX ORDER — METHYLERGONOVINE MALEATE 0.2 MG/ML
200 INJECTION INTRAVENOUS ONCE AS NEEDED
Status: COMPLETED | OUTPATIENT
Start: 2025-04-26 | End: 2025-04-26

## 2025-04-26 RX ORDER — DOCUSATE SODIUM 100 MG/1
200 CAPSULE, LIQUID FILLED ORAL 2 TIMES DAILY PRN
Status: DISCONTINUED | OUTPATIENT
Start: 2025-04-26 | End: 2025-04-27 | Stop reason: HOSPADM

## 2025-04-26 RX ORDER — CARBOPROST TROMETHAMINE 250 UG/ML
250 INJECTION, SOLUTION INTRAMUSCULAR
Status: DISCONTINUED | OUTPATIENT
Start: 2025-04-26 | End: 2025-04-27

## 2025-04-26 RX ORDER — ONDANSETRON 4 MG/1
8 TABLET, ORALLY DISINTEGRATING ORAL EVERY 8 HOURS PRN
Status: DISCONTINUED | OUTPATIENT
Start: 2025-04-26 | End: 2025-04-27 | Stop reason: HOSPADM

## 2025-04-26 RX ORDER — OXYTOCIN 10 [USP'U]/ML
10 INJECTION, SOLUTION INTRAMUSCULAR; INTRAVENOUS ONCE AS NEEDED
Status: DISCONTINUED | OUTPATIENT
Start: 2025-04-26 | End: 2025-04-27

## 2025-04-26 RX ORDER — HYDROCORTISONE 25 MG/G
CREAM TOPICAL 3 TIMES DAILY PRN
Status: DISCONTINUED | OUTPATIENT
Start: 2025-04-26 | End: 2025-04-27 | Stop reason: HOSPADM

## 2025-04-26 RX ORDER — MISOPROSTOL 100 UG/1
800 TABLET ORAL ONCE AS NEEDED
Status: DISCONTINUED | OUTPATIENT
Start: 2025-04-26 | End: 2025-04-27

## 2025-04-26 RX ORDER — OXYTOCIN-SODIUM CHLORIDE 0.9% IV SOLN 30 UNIT/500ML 30-0.9/5 UT/ML-%
95 SOLUTION INTRAVENOUS ONCE AS NEEDED
Status: DISCONTINUED | OUTPATIENT
Start: 2025-04-26 | End: 2025-04-27

## 2025-04-26 RX ORDER — SODIUM CHLORIDE 0.9 % (FLUSH) 0.9 %
10 SYRINGE (ML) INJECTION
Status: DISCONTINUED | OUTPATIENT
Start: 2025-04-26 | End: 2025-04-27 | Stop reason: HOSPADM

## 2025-04-26 RX ORDER — HYDROXYZINE 50 MG/ML
50 INJECTION, SOLUTION INTRAMUSCULAR ONCE
Status: COMPLETED | OUTPATIENT
Start: 2025-04-26 | End: 2025-04-26

## 2025-04-26 RX ORDER — CALCIUM CARBONATE 200(500)MG
500 TABLET,CHEWABLE ORAL ONCE
Status: DISCONTINUED | OUTPATIENT
Start: 2025-04-26 | End: 2025-04-26

## 2025-04-26 RX ORDER — METRONIDAZOLE 250 MG/1
500 TABLET ORAL 3 TIMES DAILY
Status: DISCONTINUED | OUTPATIENT
Start: 2025-04-26 | End: 2025-04-27 | Stop reason: HOSPADM

## 2025-04-26 RX ORDER — HYDROCODONE BITARTRATE AND ACETAMINOPHEN 7.5; 325 MG/1; MG/1
1 TABLET ORAL EVERY 4 HOURS PRN
Refills: 0 | Status: DISCONTINUED | OUTPATIENT
Start: 2025-04-26 | End: 2025-04-27 | Stop reason: HOSPADM

## 2025-04-26 RX ORDER — OXYTOCIN-SODIUM CHLORIDE 0.9% IV SOLN 30 UNIT/500ML 30-0.9/5 UT/ML-%
30 SOLUTION INTRAVENOUS ONCE AS NEEDED
Status: DISCONTINUED | OUTPATIENT
Start: 2025-04-26 | End: 2025-04-27

## 2025-04-26 RX ORDER — DIPHENHYDRAMINE HYDROCHLORIDE 50 MG/ML
25 INJECTION, SOLUTION INTRAMUSCULAR; INTRAVENOUS EVERY 4 HOURS PRN
Status: DISCONTINUED | OUTPATIENT
Start: 2025-04-26 | End: 2025-04-27 | Stop reason: HOSPADM

## 2025-04-26 RX ORDER — IBUPROFEN 400 MG/1
400 TABLET ORAL EVERY 6 HOURS PRN
Status: DISCONTINUED | OUTPATIENT
Start: 2025-04-26 | End: 2025-04-27 | Stop reason: HOSPADM

## 2025-04-26 RX ORDER — SIMETHICONE 80 MG
1 TABLET,CHEWABLE ORAL EVERY 6 HOURS PRN
Status: DISCONTINUED | OUTPATIENT
Start: 2025-04-26 | End: 2025-04-27 | Stop reason: HOSPADM

## 2025-04-26 RX ORDER — TRANEXAMIC ACID 10 MG/ML
1000 INJECTION, SOLUTION INTRAVENOUS EVERY 30 MIN PRN
Status: DISCONTINUED | OUTPATIENT
Start: 2025-04-26 | End: 2025-04-27

## 2025-04-26 RX ORDER — CALCIUM CARBONATE 200(500)MG
500 TABLET,CHEWABLE ORAL ONCE
Status: DISCONTINUED | OUTPATIENT
Start: 2025-04-26 | End: 2025-04-27 | Stop reason: HOSPADM

## 2025-04-26 RX ORDER — DIPHENOXYLATE HYDROCHLORIDE AND ATROPINE SULFATE 2.5; .025 MG/1; MG/1
2 TABLET ORAL EVERY 6 HOURS PRN
Status: DISCONTINUED | OUTPATIENT
Start: 2025-04-26 | End: 2025-04-27 | Stop reason: HOSPADM

## 2025-04-26 RX ORDER — ACETAMINOPHEN 325 MG/1
650 TABLET ORAL EVERY 6 HOURS PRN
Status: DISCONTINUED | OUTPATIENT
Start: 2025-04-26 | End: 2025-04-27 | Stop reason: HOSPADM

## 2025-04-26 RX ORDER — ACETAMINOPHEN 325 MG/1
650 TABLET ORAL EVERY 6 HOURS SCHEDULED
Status: DISCONTINUED | OUTPATIENT
Start: 2025-04-26 | End: 2025-04-26

## 2025-04-26 RX ADMIN — METRONIDAZOLE 500 MG: 250 TABLET ORAL at 09:04

## 2025-04-26 RX ADMIN — ACETAMINOPHEN 500 MG: 500 TABLET, FILM COATED ORAL at 03:04

## 2025-04-26 RX ADMIN — HYDROCODONE BITARTRATE AND ACETAMINOPHEN 1 TABLET: 7.5; 325 TABLET ORAL at 03:04

## 2025-04-26 RX ADMIN — IBUPROFEN 400 MG: 400 TABLET, FILM COATED ORAL at 06:04

## 2025-04-26 RX ADMIN — IBUPROFEN 400 MG: 400 TABLET, FILM COATED ORAL at 05:04

## 2025-04-26 RX ADMIN — HYDROXYZINE HYDROCHLORIDE 50 MG: 50 INJECTION, SOLUTION INTRAMUSCULAR at 01:04

## 2025-04-26 RX ADMIN — IBUPROFEN 400 MG: 400 TABLET, FILM COATED ORAL at 12:04

## 2025-04-26 RX ADMIN — ACETAMINOPHEN 650 MG: 325 TABLET, FILM COATED ORAL at 11:04

## 2025-04-26 RX ADMIN — AMPICILLIN SODIUM AND SULBACTAM SODIUM 3 G: 2; 1 INJECTION, POWDER, FOR SOLUTION INTRAMUSCULAR; INTRAVENOUS at 03:04

## 2025-04-26 RX ADMIN — METHYLERGONOVINE MALEATE 200 MCG: 0.2 INJECTION INTRAVENOUS at 12:04

## 2025-04-26 RX ADMIN — PRENATAL VITAMINS-IRON FUMARATE 27 MG IRON-FOLIC ACID 0.8 MG TABLET 1 TABLET: at 08:04

## 2025-04-26 RX ADMIN — BENZOCAINE AND LEVOMENTHOL: 200; 5 SPRAY TOPICAL at 05:04

## 2025-04-26 RX ADMIN — AMPICILLIN SODIUM AND SULBACTAM SODIUM 3 G: 2; 1 INJECTION, POWDER, FOR SOLUTION INTRAMUSCULAR; INTRAVENOUS at 09:04

## 2025-04-26 RX ADMIN — AMPICILLIN SODIUM AND SULBACTAM SODIUM 3 G: 2; 1 INJECTION, POWDER, FOR SOLUTION INTRAMUSCULAR; INTRAVENOUS at 08:04

## 2025-04-26 NOTE — L&D DELIVERY NOTE
[facility]-Labor & Delivery  Vaginal Delivery Note      Pre Operative Diagnosis: 38w1d   Post Operative Diagnosis: same  Procedure: Spontaneous vaginal delivery  Provider: Rohan Plascencia MD  Anesthesia:  Epidural  QBL:  300 cc  Complications: none    Indications:  Elizabeth Alegre is a 25 y.o.  female with IUP at 38w1d  who was admitted for labor management.     Procedure in detail: Patient found to be complete.  Anesthesia was found to be adequate.  Patient was placed in the dorsal lithotomy position.  Patient was prepped and draped in normal standard sterile fashion. Patient was allowed to Valsalva.  Single loose nuchal cord reduced easily.  Gentle downward traction in the axial plane was applied to the fetus avoiding neck deviation. No evidence of shoulder dystocia. Patient delivered a viable, vigorous infant moving both arms without difficulty after birth, see below.  1 minute delayed cord clamp.  Placenta delivered spontaneous intact with a three-vessel cord.  Following this cervix and vagina were examined for lacerations, none were found.  Excellent hemostasis was achieved with IV Pitocin and uterine massage.  Mother and infant both doing well.  Infant was placed on mother's chest for skin to skin.   Infant was suctioned with a bulb upon delivery of the head followed by uncomplicated delivery of the infant as stated above.  Cord clamped and cut and infant passed to delivery nurse team present for delivery.  Apgars 8/9, cord blood obtained and cord segment obtained for cord gas determination.  Small inner labia minora tears were repaired with 3-0 chromic suture.  No complications for the delivery    Lap and sponge count correctl x2.       Infant delivery information unavailable at time of this note. See nursing note.

## 2025-04-26 NOTE — NURSING
"FAMILY CAME TO NURSES STATION TO REPORT THAT PT WAS "SHAKING AND HER LIPS ARE BLUE."     PT OBSERVED TO BE SHAKING, STATES HER "WHOLE BODY FEELS TIGHT". VAGINAL BLEEDING WNL. MANUAL /78, , RR 24, TEMP 99.2, O2 SAT RANGING 88-94% ON ROOM AIR. DR ZARAGOZA ON UNIT, NOTIFIED OF PT STATUS AND VITAL SIGNS.    0120- DR ZARAGOZA AT BEDSIDE TO ASSESS PT. ORDER RECEIVED FOR O2 2L PER NASAL CANNULA, VISTARIL 50MG IM. VORB.  "

## 2025-04-26 NOTE — H&P
"   HISTORY AND PHYSICAL                                                OBSTETRICS          Subjective:      Elizabeth Alegre is a 25 y.o.  female with IUP at 38w0d weeks gestation who presents to L&D  in labor.  Term gestation    States positive fetal movement. Denies vaginal bleeding, vaginal discharge, loss of fluid. Negative Preeclampsia ROS.     Pertinent medical history for this pregnancy includes neg    Care this pregnancy has been with Dr. Kerns    PMHx: History reviewed. No pertinent past medical history.    PSHx:   Past Surgical History:   Procedure Laterality Date    TUBES IN EARS AS CHILD         All: Review of patient's allergies indicates:  No Known Allergies    Meds: Prescriptions Prior to Admission[1]    SH: Social History[2]    FH:   Family History   Problem Relation Name Age of Onset    Hypertension Father         OBHx:   OB History    Para Term  AB Living   3 2 2 0 0 2   SAB IAB Ectopic Multiple Live Births   0 0 0 0 2      # Outcome Date GA Lbr Jaison/2nd Weight Sex Type Anes PTL Lv   3 Current            2 Term 21 39w0d  3.345 kg (7 lb 6 oz) M Vag-Spont EPI  DEBBIE   1 Term 19 39w0d  2.92 kg (6 lb 7 oz) M Vag-Spont EPI  DEBBIE       Objective:      /70 (BP Location: Right arm, Patient Position: Sitting)   Pulse 100   Temp 97.2 °F (36.2 °C) (Oral)   Ht 5' 2" (1.575 m)   Wt 60.3 kg (133 lb)   LMP 2024   Breastfeeding Yes   BMI 24.33 kg/m²        General:   alert and cooperative   HEENT:  normocephalic, atraumatic   Respirations:   Normal and non-labored   Abdomen:  gravid, non-tender   Extremities non-tender, no edema   Derm: no rashes or lesions   Psych: appropriate mood and affect   Pelvis:  adequate       FHT: Category: 1                 TOCO: Contractions: regular, every 5 minutes       Cervix:     Dilation: 4 cm    Effacement: 70    Station:  -1    Consistency: moderate    Position mid          Lab Results   Component Value Date    WBC 12.27 (H) " 2025    HGB 9.6 (L) 2025    HCT 29.6 (L) 2025    MCV 79.4 2025     2025            Assessment:     25 y.o.  at 38w0d weeks gestation admitted for labor management.  GBS pos status. Will order PCN prophylaxis.    There are no hospital problems to display for this patient.         Plan:     1. Risks, benefits, alternatives and possible complications have been discussed in detail with the patient. All questions have been answered, and Ms. Alegre has voiced understanding and agrees to the treatment plan.  2. Consents signed and in chart  3. Admit to Labor and Delivery unit  4. Augment with oxytocin prn  5. Continuous fetal monitoring   6.  Assume   7. Epidural when desires- I am aware of the plan of care prior to the administration of CRNA anesthesia services.              [1]   Medications Prior to Admission   Medication Sig Dispense Refill Last Dose/Taking    prenatal 21-iron fu-folic acid 14 mg iron- 400 mcg Tab Take by mouth.      [2]   Social History  Socioeconomic History    Marital status: Single   Tobacco Use    Smoking status: Never     Passive exposure: Never    Smokeless tobacco: Never   Substance and Sexual Activity    Alcohol use: Not Currently     Comment: social    Drug use: Never    Sexual activity: Yes     Partners: Male     Birth control/protection: None   Social History Narrative    ** Merged History Encounter **

## 2025-04-26 NOTE — ANESTHESIA POSTPROCEDURE EVALUATION
Anesthesia Post Evaluation    Patient: Elizabeth Alegre    Procedure(s) Performed: * No procedures listed *    Final Anesthesia Type: epidural      Patient location during evaluation: labor & delivery  Patient participation: Yes- Able to Participate  Level of consciousness: awake and alert, awake and oriented  Post-procedure vital signs: reviewed and stable  Pain management: adequate  Airway patency: patent    PONV status at discharge: No PONV  Anesthetic complications: no      Cardiovascular status: blood pressure returned to baseline  Respiratory status: unassisted, room air and spontaneous ventilation  Hydration status: euvolemic  Follow-up not needed.              Vitals Value Taken Time   /78 04/26/25 03:23   Temp 38.3 °C (100.9 °F) 04/26/25 05:00   Pulse 98 04/26/25 03:25   Resp 24 04/26/25 01:20   SpO2 82 % 04/26/25 03:25   Vitals shown include unfiled device data.      No case tracking events are documented in the log.      Pain/Elsa Score: Pain Rating Prior to Med Admin: 4 (4/26/2025  5:18 AM)

## 2025-04-26 NOTE — NURSING
PT STATES SHE IS FEELING SLIGHTLY BETTER, SHAKING HAS RESOLVED. VITAL SIGNS REMAIN WNL. NO FURTHER NEEDS OR COMPLAINTS AT THIS TIME.

## 2025-04-27 VITALS
HEIGHT: 62 IN | WEIGHT: 133 LBS | TEMPERATURE: 98 F | SYSTOLIC BLOOD PRESSURE: 117 MMHG | RESPIRATION RATE: 16 BRPM | OXYGEN SATURATION: 99 % | HEART RATE: 87 BPM | DIASTOLIC BLOOD PRESSURE: 70 MMHG | BODY MASS INDEX: 24.48 KG/M2

## 2025-04-27 PROCEDURE — 63600175 PHARM REV CODE 636 W HCPCS: Performed by: OBSTETRICS & GYNECOLOGY

## 2025-04-27 PROCEDURE — 51702 INSERT TEMP BLADDER CATH: CPT

## 2025-04-27 PROCEDURE — 59025 FETAL NON-STRESS TEST: CPT

## 2025-04-27 PROCEDURE — 25000003 PHARM REV CODE 250: Performed by: OBSTETRICS & GYNECOLOGY

## 2025-04-27 PROCEDURE — 72200005 HC VAGINAL DELIVERY LEVEL II

## 2025-04-27 PROCEDURE — 72100002 HC LABOR CARE, 1ST 8 HOURS

## 2025-04-27 RX ORDER — TRIPROLIDINE/PSEUDOEPHEDRINE 2.5MG-60MG
TABLET ORAL EVERY 6 HOURS PRN
Refills: 0 | OUTPATIENT
Start: 2025-04-27

## 2025-04-27 RX ORDER — IBUPROFEN 600 MG/1
600 TABLET ORAL EVERY 6 HOURS PRN
Qty: 30 TABLET | Refills: 0 | Status: SHIPPED | OUTPATIENT
Start: 2025-04-27

## 2025-04-27 RX ADMIN — PRENATAL VITAMINS-IRON FUMARATE 27 MG IRON-FOLIC ACID 0.8 MG TABLET 1 TABLET: at 09:04

## 2025-04-27 RX ADMIN — AMPICILLIN SODIUM AND SULBACTAM SODIUM 3 G: 2; 1 INJECTION, POWDER, FOR SOLUTION INTRAMUSCULAR; INTRAVENOUS at 09:04

## 2025-04-27 RX ADMIN — AMPICILLIN SODIUM AND SULBACTAM SODIUM 3 G: 2; 1 INJECTION, POWDER, FOR SOLUTION INTRAMUSCULAR; INTRAVENOUS at 03:04

## 2025-04-27 RX ADMIN — METRONIDAZOLE 500 MG: 250 TABLET ORAL at 09:04

## 2025-04-27 RX ADMIN — IBUPROFEN 400 MG: 400 TABLET, FILM COATED ORAL at 07:04

## 2025-04-27 NOTE — DISCHARGE SUMMARY
Delivery Discharge Summary  Obstetrics        Discharge Provider: Rohan Plascencia MD    Admission date: 2025  Discharge date: 2025    Admit Dx:   Problem List[1]  Discharge Dx:  Spontaneous vaginal delivery term infant      Hospital Course:  Elizabeth Alegre is a 25 y.o. now  who was admitted on 2025 for delivery. Patient had a normal . Please see delivery note for further details.  Her postpartum course was uncomplicated. On the date of discharge, patient's pain is controlled with oral pain medications. She is tolerating ambulation without SOB or CP, and PO diet without N/V. Reported lochia is within the normal range. Pt in stable condition and ready for discharge.  Patient had a short course of diarrhea that has resolved      DISCHARGE PHYSICAL EXAM  Temp:  [97.8 °F (36.6 °C)-98.3 °F (36.8 °C)] 97.9 °F (36.6 °C)  Pulse:  [76-95] 91  Resp:  [18] 18  BP: (105-130)/(62-81) 120/80  No intake or output data in the 24 hours ending 25 1004  Body mass index is 24.33 kg/m².    General: no acute distress  Respirations: normal and non-labored  Abdomen: soft, non-tender, non-distended; Fundus firm and below the umbilicus    Extremities: non-tender, symmetric, trace BLE edema, neg Lucas's Bilateral     Pertinent studies:  Lab Results   Component Value Date/Time    GROUPTRH O POS 2025 03:49 PM     Recent Results (from the past 2 weeks)   CBC with Differential    Collection Time: 25  6:47 AM   Result Value Ref Range    WBC 12.95 (H) 4.00 - 11.50 x10(3)/mcL    Hgb 9.5 (L) 11.8 - 16.0 g/dL    Hct 28.9 (L) 36.0 - 48.0 %    Platelet 170 140 - 371 x10(3)/mcL   CBC with Differential    Collection Time: 25 12:19 PM   Result Value Ref Range    WBC 12.27 (H) 4.00 - 11.50 x10(3)/mcL    Hgb 9.6 (L) 11.8 - 16.0 g/dL    Hct 29.6 (L) 36.0 - 48.0 %    Platelet 184 140 - 371 x10(3)/mcL         Disposition: To home, self care    Follow Up:  4 weeks    Patient Instructions:   1. Call the  office for any bleeding >2 pads/hour for >2 hours, temperature >100.4, pain that is uncontrolled with medications, or for any other concerns.  2. Breastfeeding encouraged  3. Complete pelvic rest for 6 weeks  4. Pre Eclampsia precautions  5. No driving while on narcotics.        Rohan Plascencia MD       [1]   Patient Active Problem List  Diagnosis    Encounter for vaginal delivery

## 2025-04-27 NOTE — DISCHARGE SUMMARY
Delivery Discharge Summary  Obstetrics        Discharge Provider: Rohan Plascencia MD    Admission date: 2025  Discharge date: 2025    Admit Dx:   Problem List[1]  Discharge Dx:  Spontaneous vaginal delivery term infant      Hospital Course:  Elizabeth Alegre is a 25 y.o. now  who was admitted on 2025 for delivery. Patient had a pain is vaginal delivery. Please see delivery note for further details.  Her postpartum course was uncomplicated. She is breast feeding without difficulty.  On the date of discharge, patient's pain is controlled with oral pain medications. She is tolerating ambulation without SOB or CP, and PO diet without N/V. Reported lochia is within the normal range. Pt in stable condition and ready for discharge.   Patient had a short course of diarrhea that resolved.    DISCHARGE PHYSICAL EXAM  Temp:  [97.8 °F (36.6 °C)-98.3 °F (36.8 °C)] 97.9 °F (36.6 °C)  Pulse:  [76-95] 91  Resp:  [18] 18  BP: (105-130)/(62-81) 120/80  No intake or output data in the 24 hours ending 25 0958  Body mass index is 24.33 kg/m².    General: no acute distress  Heart: RRR, normal S1/S2  Lungs: non-labored, CTA with normal air movement throughout  Abdomen: soft, non-tender, non-distended; Fundus firm and below the umbilicus, Incision: C/D/I    Extremities: non-tender, symmetric, trace BLE edema, neg Lucas's Bilateral     Pertinent studies:  Lab Results   Component Value Date/Time    GROUPTRH O POS 2025 03:49 PM     Recent Results (from the past 2 weeks)   CBC with Differential    Collection Time: 25  6:47 AM   Result Value Ref Range    WBC 12.95 (H) 4.00 - 11.50 x10(3)/mcL    Hgb 9.5 (L) 11.8 - 16.0 g/dL    Hct 28.9 (L) 36.0 - 48.0 %    Platelet 170 140 - 371 x10(3)/mcL   CBC with Differential    Collection Time: 25 12:19 PM   Result Value Ref Range    WBC 12.27 (H) 4.00 - 11.50 x10(3)/mcL    Hgb 9.6 (L) 11.8 - 16.0 g/dL    Hct 29.6 (L) 36.0 - 48.0 %    Platelet 184 140 - 371  x10(3)/mcL         Disposition: To home, self care    Follow Up:  4weeks    Patient Instructions:   1. Call the office for any bleeding >2 pads/hour for >2 hours, temperature >100.4, pain that is uncontrolled with medications, or for any other concerns.  2. Breastfeeding encouraged  3. Do not submerge incision for 2 weeks  4. Complete pelvic rest for 6 weeks  5. No heavy lifting, strenuous exercises for 6 weeks  6. Pre Eclampsia precautions  7. No driving while on narcotics.        Rohan Plascencia MD       [1]   Patient Active Problem List  Diagnosis    Encounter for vaginal delivery

## 2025-05-28 ENCOUNTER — POSTPARTUM VISIT (OUTPATIENT)
Dept: OBSTETRICS AND GYNECOLOGY | Facility: CLINIC | Age: 25
End: 2025-05-28
Payer: MEDICAID

## 2025-05-28 VITALS
WEIGHT: 116 LBS | RESPIRATION RATE: 18 BRPM | SYSTOLIC BLOOD PRESSURE: 122 MMHG | OXYGEN SATURATION: 98 % | BODY MASS INDEX: 21.35 KG/M2 | HEIGHT: 62 IN | HEART RATE: 89 BPM | DIASTOLIC BLOOD PRESSURE: 78 MMHG

## 2025-05-28 NOTE — PROGRESS NOTES
"  Subjective:      Elizabeth Alegre is a 25 y.o.  who presents for a postpartum visit.    She is status post  uncomplicated vaginal delivery 4 weeks ago.    Her hospitalization was not complicated.    She is breastfeeding.    She desires no method for contraception.    She denies signs and symptoms of postpartum depression.   Her last pap was on 10/7/2024.   Postpartum Care (C/O FREQUENT HEADACHES. )        History reviewed. No pertinent past medical history.  Past Surgical History:   Procedure Laterality Date    TUBES IN EARS AS CHILD       Social History[1]  Family History   Problem Relation Name Age of Onset    Hypertension Father       OB History    Para Term  AB Living   3 3 3 0 0 3   SAB IAB Ectopic Multiple Live Births   0 0 0 0 3      # Outcome Date GA Lbr Jaison/2nd Weight Sex Type Anes PTL Lv   3 Term 25 38w1d 10:10 / 00:22 3.215 kg (7 lb 1.4 oz) M Vag-Spont EPI N DEBBIE   2 Term 21 39w0d  3.345 kg (7 lb 6 oz) M Vag-Spont EPI  DEBBIE   1 Term 19 39w0d  2.92 kg (6 lb 7 oz) M Vag-Spont EPI  DEBBIE     Current Medications[2]   Review the Delivery Report for details.     ROS:   Review of Systems  A comprehensive review of symptoms was completed and negative except as noted above.    Objective:     /78 (BP Location: Left arm, Patient Position: Sitting)   Pulse 89   Resp 18   Ht 5' 2" (1.575 m)   Wt 52.6 kg (116 lb)   LMP 2024   SpO2 98%   Breastfeeding Yes   BMI 21.22 kg/m²   Constitutional:  General Appearance : alert, in no acute distress, normal, well nourished.  Breast:  Right: Inspection/palpation: no discharge, no masses present, no nipple retraction, no skin changes, no skin dimpling, no tenderness, no lymphadenopathy, no axillary mass, no axillary tenderness.  Left: Inspection/palpation: no discharge, no masses present, no nipple retraction, no skin changes, no skin dimpling, no tenderness, no lymphadenopathy, no axillary mass, no axillary " tenderness.  Abdomen:  Genitourinary:  External Genitalia: normal, no lesions.  Vagina: normal appearance, no abnormal discharge, no lesions.  Bladder: no mass, nontender.  Urethra: no erythema or lesions present.  Cervix: no lesions, non tender. Pap N/A  Uterus: nontender, normal contour, normal mobility, normal size.   Adnexa: no masses, no tenderness.  Anus and Perineum: visually normal.   Chaperone Present  Assessment:     Postpartum exam        Plan:        Follow up in about 5 months (around 10/28/2025) for ANNUAL. In addition to their scheduled follow up, the patient has also been instructed to follow up on as needed basis.          [1]   Social History  Tobacco Use    Smoking status: Never     Passive exposure: Never    Smokeless tobacco: Never   Substance Use Topics    Alcohol use: Not Currently     Comment: social    Drug use: Never   [2]   Current Outpatient Medications:     ibuprofen (ADVIL,MOTRIN) 600 MG tablet, Take 1 tablet (600 mg total) by mouth every 6 (six) hours as needed for Pain., Disp: 30 tablet, Rfl: 0    prenatal 21-iron fu-folic acid 14 mg iron- 400 mcg Tab, Take by mouth., Disp: , Rfl: